# Patient Record
Sex: MALE | Race: WHITE | NOT HISPANIC OR LATINO | URBAN - METROPOLITAN AREA
[De-identification: names, ages, dates, MRNs, and addresses within clinical notes are randomized per-mention and may not be internally consistent; named-entity substitution may affect disease eponyms.]

---

## 2017-11-02 PROBLEM — R97.20 ELEVATED PSA: Status: ACTIVE | Noted: 2017-10-02

## 2017-11-02 PROBLEM — R39.15 URINARY URGENCY: Status: ACTIVE | Noted: 2017-11-02

## 2017-11-02 PROBLEM — I10 ESSENTIAL HYPERTENSION: Status: ACTIVE | Noted: 2017-11-02

## 2017-11-17 PROBLEM — Z80.42 FAMILY HISTORY OF PROSTATE CANCER: Status: ACTIVE | Noted: 2017-11-17

## 2018-04-18 ENCOUNTER — APPOINTMENT (RX ONLY)
Dept: URBAN - METROPOLITAN AREA CLINIC 349 | Facility: CLINIC | Age: 68
Setting detail: DERMATOLOGY
End: 2018-04-18

## 2018-04-18 DIAGNOSIS — L20.89 OTHER ATOPIC DERMATITIS: ICD-10-CM

## 2018-04-18 DIAGNOSIS — L57.0 ACTINIC KERATOSIS: ICD-10-CM

## 2018-04-18 DIAGNOSIS — D18.0 HEMANGIOMA: ICD-10-CM

## 2018-04-18 DIAGNOSIS — L82.0 INFLAMED SEBORRHEIC KERATOSIS: ICD-10-CM

## 2018-04-18 DIAGNOSIS — Z71.89 OTHER SPECIFIED COUNSELING: ICD-10-CM

## 2018-04-18 DIAGNOSIS — D22 MELANOCYTIC NEVI: ICD-10-CM

## 2018-04-18 DIAGNOSIS — L82.1 OTHER SEBORRHEIC KERATOSIS: ICD-10-CM

## 2018-04-18 DIAGNOSIS — L81.4 OTHER MELANIN HYPERPIGMENTATION: ICD-10-CM

## 2018-04-18 PROBLEM — L20.84 INTRINSIC (ALLERGIC) ECZEMA: Status: ACTIVE | Noted: 2018-04-18

## 2018-04-18 PROBLEM — D22.5 MELANOCYTIC NEVI OF TRUNK: Status: ACTIVE | Noted: 2018-04-18

## 2018-04-18 PROBLEM — D22.72 MELANOCYTIC NEVI OF LEFT LOWER LIMB, INCLUDING HIP: Status: ACTIVE | Noted: 2018-04-18

## 2018-04-18 PROBLEM — D18.01 HEMANGIOMA OF SKIN AND SUBCUTANEOUS TISSUE: Status: ACTIVE | Noted: 2018-04-18

## 2018-04-18 PROCEDURE — ? PHOTO-DOCUMENTATION

## 2018-04-18 PROCEDURE — ? COUNSELING

## 2018-04-18 PROCEDURE — ? LIQUID NITROGEN

## 2018-04-18 PROCEDURE — 17003 DESTRUCT PREMALG LES 2-14: CPT

## 2018-04-18 PROCEDURE — ? BENIGN DESTRUCTION

## 2018-04-18 PROCEDURE — 99243 OFF/OP CNSLTJ NEW/EST LOW 30: CPT | Mod: 25

## 2018-04-18 PROCEDURE — ? RECOMMENDATIONS

## 2018-04-18 PROCEDURE — 17110 DESTRUCTION B9 LES UP TO 14: CPT

## 2018-04-18 PROCEDURE — 17000 DESTRUCT PREMALG LESION: CPT | Mod: 59

## 2018-04-18 ASSESSMENT — LOCATION SIMPLE DESCRIPTION DERM
LOCATION SIMPLE: RIGHT SHOULDER
LOCATION SIMPLE: LEFT SCALP
LOCATION SIMPLE: POSTERIOR NECK
LOCATION SIMPLE: LEFT FOOT
LOCATION SIMPLE: LEFT THIGH
LOCATION SIMPLE: LEFT UPPER BACK
LOCATION SIMPLE: ABDOMEN
LOCATION SIMPLE: RIGHT ANTERIOR NECK
LOCATION SIMPLE: SCALP
LOCATION SIMPLE: UPPER BACK
LOCATION SIMPLE: LEFT ANTERIOR NECK
LOCATION SIMPLE: RIGHT THIGH
LOCATION SIMPLE: LEFT CHEEK
LOCATION SIMPLE: LEFT FOREHEAD
LOCATION SIMPLE: CHEST
LOCATION SIMPLE: LEFT SHOULDER
LOCATION SIMPLE: ANTERIOR SCALP

## 2018-04-18 ASSESSMENT — LOCATION DETAILED DESCRIPTION DERM
LOCATION DETAILED: LEFT SUPERIOR PARIETAL SCALP
LOCATION DETAILED: LEFT LATERAL MALAR CHEEK
LOCATION DETAILED: EPIGASTRIC SKIN
LOCATION DETAILED: RIGHT SUPERIOR PARIETAL SCALP
LOCATION DETAILED: RIGHT MEDIAL INFERIOR CHEST
LOCATION DETAILED: RIGHT CLAVICULAR NECK
LOCATION DETAILED: LEFT CENTRAL FRONTAL SCALP
LOCATION DETAILED: XIPHOID
LOCATION DETAILED: RIGHT POSTERIOR SHOULDER
LOCATION DETAILED: LEFT SUPERIOR MEDIAL FOREHEAD
LOCATION DETAILED: LEFT POSTERIOR SHOULDER
LOCATION DETAILED: SUPERIOR THORACIC SPINE
LOCATION DETAILED: LEFT DORSAL FOOT
LOCATION DETAILED: LEFT MID-UPPER BACK
LOCATION DETAILED: LEFT INFERIOR LATERAL FOREHEAD
LOCATION DETAILED: PERIUMBILICAL SKIN
LOCATION DETAILED: LEFT ANTERIOR DISTAL THIGH
LOCATION DETAILED: MID POSTERIOR NECK
LOCATION DETAILED: MID-FRONTAL SCALP
LOCATION DETAILED: LEFT MEDIAL INFERIOR CHEST
LOCATION DETAILED: LEFT ANTERIOR PROXIMAL THIGH
LOCATION DETAILED: INFERIOR THORACIC SPINE
LOCATION DETAILED: RIGHT ANTERIOR LATERAL PROXIMAL THIGH
LOCATION DETAILED: RIGHT ANTERIOR DISTAL THIGH
LOCATION DETAILED: LEFT LATERAL SUPERIOR CHEST
LOCATION DETAILED: LEFT CLAVICULAR NECK

## 2018-04-18 ASSESSMENT — LOCATION ZONE DERM
LOCATION ZONE: LEG
LOCATION ZONE: FACE
LOCATION ZONE: NECK
LOCATION ZONE: ARM
LOCATION ZONE: SCALP
LOCATION ZONE: TRUNK
LOCATION ZONE: FEET

## 2018-04-18 NOTE — PROCEDURE: RECOMMENDATIONS
Recommendations (Free Text): Per patient rash comes and goes and clears up with hydrocortisone \\nTold to continue hydrocortisone as needed for flares \\nNo rash present on exam today
Detail Level: Zone

## 2018-04-18 NOTE — PROCEDURE: BENIGN DESTRUCTION
Add 52 Modifier (Optional): no
Detail Level: Detailed
Treatment Number (Will Not Render If 0): 0
Post-Care Instructions: I reviewed with the patient in detail post-care instructions. Patient is to wear sunprotection, and avoid picking at any of the treated lesions. Pt may apply Vaseline to crusted or scabbing areas.
Medical Necessity Clause: This procedure was medically necessary because the lesions that were treated were:
Anesthesia Volume In Cc: 0.5
Medical Necessity Information: It is in your best interest to select a reason for this procedure from the list below. All of these items fulfill various CMS LCD requirements except the new and changing color options.
Consent: The patient's consent was obtained including but not limited to risks of crusting, scabbing, blistering, scarring, darker or lighter pigmentary change, recurrence, incomplete removal and infection.

## 2018-04-18 NOTE — PROCEDURE: LIQUID NITROGEN
Consent: The patient's consent was obtained including but not limited to risks of crusting, scabbing, blistering, scarring, darker or lighter pigmentary change, recurrence, incomplete removal and infection.
Render Post-Care Instructions In Note?: no
Number Of Freeze-Thaw Cycles: 2 freeze-thaw cycles
Post-Care Instructions: I reviewed with the patient in detail post-care instructions. Patient is to wear sunprotection, and avoid picking at any of the treated lesions. Pt may apply Vaseline to crusted or scabbing areas.
Detail Level: Detailed
Duration Of Freeze Thaw-Cycle (Seconds): 2

## 2018-05-02 ENCOUNTER — HOSPITAL ENCOUNTER (OUTPATIENT)
Dept: SURGERY | Age: 68
Discharge: HOME | End: 2018-05-02

## 2018-05-02 VITALS — BODY MASS INDEX: 24.5 KG/M2 | WEIGHT: 175 LBS | HEIGHT: 71 IN

## 2018-05-02 RX ORDER — CHOLECALCIFEROL TAB 125 MCG (5000 UNIT) 125 MCG
5000 TAB ORAL DAILY
COMMUNITY

## 2018-05-02 RX ORDER — IBUPROFEN 200 MG
200 TABLET ORAL AS NEEDED
COMMUNITY
End: 2019-01-04

## 2018-05-02 NOTE — PERIOP NOTES
Patient verified name, , and surgery as listed in [facility]. Type 1b surgery, phone assessment complete. Orders YES received. Labs per surgeon: none  Labs per anesthesia protocol: none      Patient answered medical/surgical history questions at their best of ability. All prior to admission medications documented in [facility]. Patient instructed to take the following medications the day of surgery according to anesthesia guidelines with a small sip of water: none . Hold all vitamins 7 days prior to surgery and NSAIDS 5 days prior to surgery. Medications to be held Ibuprofen hold for 5 days prior to surgery. Patient instructed on the following:  Arrive at A Entrance, time of arrival to be called the day before by 1700  NPO after midnight including gum, mints, and ice chips  Responsible adult must drive patient to the hospital, stay during surgery, and patient will  need supervision 24 hours after anesthesia  Use antibacterial soap in shower the night before surgery and on the morning of surgery  Leave all valuables (money and jewelry) at home but bring insurance card and ID on       DOS  Do not wear make-up, nail polish, lotions, cologne, perfumes, powders, or oil on skin. Patient teach back successful and patient demonstrates knowledge of instruction.

## 2018-05-08 ENCOUNTER — ANESTHESIA EVENT (OUTPATIENT)
Dept: SURGERY | Age: 68
End: 2018-05-08
Payer: MEDICARE

## 2018-05-09 ENCOUNTER — HOSPITAL ENCOUNTER (OUTPATIENT)
Age: 68
Setting detail: OUTPATIENT SURGERY
Discharge: HOME | End: 2018-05-09
Attending: SURGERY | Admitting: SURGERY
Payer: MEDICARE

## 2018-05-09 ENCOUNTER — ANESTHESIA (OUTPATIENT)
Dept: SURGERY | Age: 68
End: 2018-05-09
Payer: MEDICARE

## 2018-05-09 VITALS
HEIGHT: 71 IN | OXYGEN SATURATION: 96 % | RESPIRATION RATE: 20 BRPM | SYSTOLIC BLOOD PRESSURE: 150 MMHG | BODY MASS INDEX: 24.5 KG/M2 | TEMPERATURE: 97.3 F | HEART RATE: 62 BPM | WEIGHT: 175 LBS | DIASTOLIC BLOOD PRESSURE: 86 MMHG

## 2018-05-09 DIAGNOSIS — K60.2 ANAL FISSURE: Primary | ICD-10-CM

## 2018-05-09 PROCEDURE — 77030014007 HC SPNG HEMSTAT J&J -B: Performed by: SURGERY

## 2018-05-09 PROCEDURE — 77030011640 HC PAD GRND REM COVD -A: Performed by: SURGERY

## 2018-05-09 PROCEDURE — 76060000032 HC ANESTHESIA 0.5 TO 1 HR: Performed by: SURGERY

## 2018-05-09 PROCEDURE — 77030018846 HC SOL IRR STRL H20 ICUM -A: Performed by: SURGERY

## 2018-05-09 PROCEDURE — 74011000250 HC RX REV CODE- 250

## 2018-05-09 PROCEDURE — 76210000016 HC OR PH I REC 1 TO 1.5 HR: Performed by: SURGERY

## 2018-05-09 PROCEDURE — 74011000250 HC RX REV CODE- 250: Performed by: SURGERY

## 2018-05-09 PROCEDURE — 74011250636 HC RX REV CODE- 250/636: Performed by: ANESTHESIOLOGY

## 2018-05-09 PROCEDURE — 74011250636 HC RX REV CODE- 250/636

## 2018-05-09 PROCEDURE — 76010000138 HC OR TIME 0.5 TO 1 HR: Performed by: SURGERY

## 2018-05-09 PROCEDURE — 77030020143 HC AIRWY LARYN INTUB CGAS -A: Performed by: ANESTHESIOLOGY

## 2018-05-09 PROCEDURE — 77030032490 HC SLV COMPR SCD KNE COVD -B: Performed by: SURGERY

## 2018-05-09 PROCEDURE — 74011250636 HC RX REV CODE- 250/636: Performed by: SURGERY

## 2018-05-09 PROCEDURE — 77030020782 HC GWN BAIR PAWS FLX 3M -B: Performed by: ANESTHESIOLOGY

## 2018-05-09 RX ORDER — SODIUM CHLORIDE, SODIUM LACTATE, POTASSIUM CHLORIDE, CALCIUM CHLORIDE 600; 310; 30; 20 MG/100ML; MG/100ML; MG/100ML; MG/100ML
100 INJECTION, SOLUTION INTRAVENOUS CONTINUOUS
Status: DISCONTINUED | OUTPATIENT
Start: 2018-05-09 | End: 2018-05-09 | Stop reason: HOSPADM

## 2018-05-09 RX ORDER — MIDAZOLAM HYDROCHLORIDE 1 MG/ML
2 INJECTION, SOLUTION INTRAMUSCULAR; INTRAVENOUS
Status: DISCONTINUED | OUTPATIENT
Start: 2018-05-09 | End: 2018-05-09 | Stop reason: HOSPADM

## 2018-05-09 RX ORDER — SODIUM CHLORIDE 0.9 % (FLUSH) 0.9 %
5-10 SYRINGE (ML) INJECTION AS NEEDED
Status: DISCONTINUED | OUTPATIENT
Start: 2018-05-09 | End: 2018-05-09 | Stop reason: HOSPADM

## 2018-05-09 RX ORDER — FENTANYL CITRATE 50 UG/ML
INJECTION, SOLUTION INTRAMUSCULAR; INTRAVENOUS AS NEEDED
Status: DISCONTINUED | OUTPATIENT
Start: 2018-05-09 | End: 2018-05-09 | Stop reason: HOSPADM

## 2018-05-09 RX ORDER — OXYCODONE AND ACETAMINOPHEN 5; 325 MG/1; MG/1
1-2 TABLET ORAL
Qty: 30 TAB | Refills: 0 | Status: SHIPPED | OUTPATIENT
Start: 2018-05-09 | End: 2018-06-05

## 2018-05-09 RX ORDER — DIPHENHYDRAMINE HYDROCHLORIDE 50 MG/ML
12.5 INJECTION, SOLUTION INTRAMUSCULAR; INTRAVENOUS
Status: DISCONTINUED | OUTPATIENT
Start: 2018-05-09 | End: 2018-05-09 | Stop reason: HOSPADM

## 2018-05-09 RX ORDER — LIDOCAINE HYDROCHLORIDE 10 MG/ML
INJECTION INFILTRATION; PERINEURAL AS NEEDED
Status: DISCONTINUED | OUTPATIENT
Start: 2018-05-09 | End: 2018-05-09 | Stop reason: HOSPADM

## 2018-05-09 RX ORDER — PROPOFOL 10 MG/ML
INJECTION, EMULSION INTRAVENOUS AS NEEDED
Status: DISCONTINUED | OUTPATIENT
Start: 2018-05-09 | End: 2018-05-09 | Stop reason: HOSPADM

## 2018-05-09 RX ORDER — LIDOCAINE HYDROCHLORIDE 20 MG/ML
INJECTION, SOLUTION EPIDURAL; INFILTRATION; INTRACAUDAL; PERINEURAL AS NEEDED
Status: DISCONTINUED | OUTPATIENT
Start: 2018-05-09 | End: 2018-05-09 | Stop reason: HOSPADM

## 2018-05-09 RX ORDER — NALOXONE HYDROCHLORIDE 0.4 MG/ML
0.2 INJECTION, SOLUTION INTRAMUSCULAR; INTRAVENOUS; SUBCUTANEOUS AS NEEDED
Status: DISCONTINUED | OUTPATIENT
Start: 2018-05-09 | End: 2018-05-09 | Stop reason: HOSPADM

## 2018-05-09 RX ORDER — OXYCODONE HYDROCHLORIDE 5 MG/1
5 TABLET ORAL
Status: DISCONTINUED | OUTPATIENT
Start: 2018-05-09 | End: 2018-05-09 | Stop reason: HOSPADM

## 2018-05-09 RX ORDER — MIDAZOLAM HYDROCHLORIDE 1 MG/ML
2 INJECTION, SOLUTION INTRAMUSCULAR; INTRAVENOUS ONCE
Status: COMPLETED | OUTPATIENT
Start: 2018-05-09 | End: 2018-05-09

## 2018-05-09 RX ORDER — ACETAMINOPHEN 500 MG
1000 TABLET ORAL ONCE
Status: DISCONTINUED | OUTPATIENT
Start: 2018-05-09 | End: 2018-05-09 | Stop reason: HOSPADM

## 2018-05-09 RX ORDER — FENTANYL CITRATE 50 UG/ML
100 INJECTION, SOLUTION INTRAMUSCULAR; INTRAVENOUS ONCE
Status: DISCONTINUED | OUTPATIENT
Start: 2018-05-09 | End: 2018-05-09 | Stop reason: HOSPADM

## 2018-05-09 RX ORDER — OXYCODONE HYDROCHLORIDE 5 MG/1
10 TABLET ORAL
Status: DISCONTINUED | OUTPATIENT
Start: 2018-05-09 | End: 2018-05-09 | Stop reason: HOSPADM

## 2018-05-09 RX ORDER — HYDROMORPHONE HYDROCHLORIDE 2 MG/ML
0.5 INJECTION, SOLUTION INTRAMUSCULAR; INTRAVENOUS; SUBCUTANEOUS
Status: DISCONTINUED | OUTPATIENT
Start: 2018-05-09 | End: 2018-05-09 | Stop reason: HOSPADM

## 2018-05-09 RX ORDER — SODIUM CHLORIDE 0.9 % (FLUSH) 0.9 %
5-10 SYRINGE (ML) INJECTION EVERY 8 HOURS
Status: DISCONTINUED | OUTPATIENT
Start: 2018-05-09 | End: 2018-05-09 | Stop reason: HOSPADM

## 2018-05-09 RX ORDER — ONDANSETRON 2 MG/ML
INJECTION INTRAMUSCULAR; INTRAVENOUS AS NEEDED
Status: DISCONTINUED | OUTPATIENT
Start: 2018-05-09 | End: 2018-05-09 | Stop reason: HOSPADM

## 2018-05-09 RX ORDER — FLUMAZENIL 0.1 MG/ML
0.2 INJECTION INTRAVENOUS
Status: DISCONTINUED | OUTPATIENT
Start: 2018-05-09 | End: 2018-05-09 | Stop reason: HOSPADM

## 2018-05-09 RX ORDER — BUPIVACAINE HYDROCHLORIDE 5 MG/ML
INJECTION, SOLUTION EPIDURAL; INTRACAUDAL AS NEEDED
Status: DISCONTINUED | OUTPATIENT
Start: 2018-05-09 | End: 2018-05-09 | Stop reason: HOSPADM

## 2018-05-09 RX ORDER — LIDOCAINE HYDROCHLORIDE 10 MG/ML
0.1 INJECTION INFILTRATION; PERINEURAL AS NEEDED
Status: DISCONTINUED | OUTPATIENT
Start: 2018-05-09 | End: 2018-05-09 | Stop reason: HOSPADM

## 2018-05-09 RX ORDER — DEXAMETHASONE SODIUM PHOSPHATE 4 MG/ML
INJECTION, SOLUTION INTRA-ARTICULAR; INTRALESIONAL; INTRAMUSCULAR; INTRAVENOUS; SOFT TISSUE AS NEEDED
Status: DISCONTINUED | OUTPATIENT
Start: 2018-05-09 | End: 2018-05-09 | Stop reason: HOSPADM

## 2018-05-09 RX ORDER — CEFAZOLIN SODIUM/WATER 2 G/20 ML
2 SYRINGE (ML) INTRAVENOUS ONCE
Status: COMPLETED | OUTPATIENT
Start: 2018-05-09 | End: 2018-05-09

## 2018-05-09 RX ADMIN — SODIUM CHLORIDE, SODIUM LACTATE, POTASSIUM CHLORIDE, AND CALCIUM CHLORIDE 100 ML/HR: 600; 310; 30; 20 INJECTION, SOLUTION INTRAVENOUS at 13:15

## 2018-05-09 RX ADMIN — SODIUM CHLORIDE, SODIUM LACTATE, POTASSIUM CHLORIDE, AND CALCIUM CHLORIDE: 600; 310; 30; 20 INJECTION, SOLUTION INTRAVENOUS at 15:30

## 2018-05-09 RX ADMIN — PROPOFOL 200 MG: 10 INJECTION, EMULSION INTRAVENOUS at 15:20

## 2018-05-09 RX ADMIN — FENTANYL CITRATE 50 MCG: 50 INJECTION, SOLUTION INTRAMUSCULAR; INTRAVENOUS at 15:20

## 2018-05-09 RX ADMIN — FENTANYL CITRATE 25 MCG: 50 INJECTION, SOLUTION INTRAMUSCULAR; INTRAVENOUS at 15:30

## 2018-05-09 RX ADMIN — LIDOCAINE HYDROCHLORIDE 100 MG: 20 INJECTION, SOLUTION EPIDURAL; INFILTRATION; INTRACAUDAL; PERINEURAL at 15:20

## 2018-05-09 RX ADMIN — DEXAMETHASONE SODIUM PHOSPHATE 8 MG: 4 INJECTION, SOLUTION INTRA-ARTICULAR; INTRALESIONAL; INTRAMUSCULAR; INTRAVENOUS; SOFT TISSUE at 15:29

## 2018-05-09 RX ADMIN — ONDANSETRON 4 MG: 2 INJECTION INTRAMUSCULAR; INTRAVENOUS at 15:29

## 2018-05-09 RX ADMIN — MIDAZOLAM HYDROCHLORIDE 2 MG: 1 INJECTION, SOLUTION INTRAMUSCULAR; INTRAVENOUS at 15:02

## 2018-05-09 RX ADMIN — Medication 2 G: at 15:25

## 2018-05-09 NOTE — IP AVS SNAPSHOT
[address] [address] 
[phone] Patient: Darleen Hayes MRN: [id number] YRL:5[birth year]9/[birth year]93 About your hospitalization You were admitted on:  May 9, 2018 You last received care in the:  [facility] PACU You were discharged on:  May 9, 2018 Why you were hospitalized Your primary diagnosis was:  Not on File Follow-up Information Follow up With Details Comments Contact Info Kaleb Syed MD   [facility] [address] [address] 
[phone] Christelle Campos MD   [facility] [address] [facility] [place] [address] 
[phone] Your Scheduled Appointments Monday May 21, 2018  8:45 AM EDT  
LAB with PRE LAB/NURSE RESOURCE [facility] ([address] [facility]) [address]   
[phone] Monday May 21, 2018  1:30 PM EDT Global Post Op with Christelle Campos MD  
[place] [facility] ([address] [place] [address]) [address] [place] [id number] [phone] Discharge Orders None A check ray indicates which time of day the medication should be taken. My Medications START taking these medications Instructions Each Dose to Equal  
 Morning Noon Evening Bedtime  
 oxyCODONE-acetaminophen 5-325 mg per tablet Commonly known as:  PERCOCET Your last dose was: Your next dose is: Take 1-2 Tabs by mouth every four (4) hours as needed for Pain. Max Daily Amount: 12 Tabs. 1-2 Tab CHANGE how you take these medications Instructions Each Dose to Equal  
 Morning Noon Evening Bedtime  
 metoprolol succinate 50 mg XL tablet Commonly known as:  TOPROL-XL What changed:  when to take this Your last dose was: Your next dose is: Take 1.5 Tabs by mouth daily. 75 mg CONTINUE taking these medications Instructions Each Dose to Equal  
 Morning Noon Evening Bedtime DOCUSATE SODIUM PO Your last dose was: Your next dose is: Take  by mouth daily. ibuprofen 200 mg tablet Commonly known as:  MOTRIN Your last dose was: Your next dose is: Take 200 mg by mouth as needed for Pain. Indications: Pain 200 mg  
    
   
   
   
  
 pravastatin 80 mg tablet Commonly known as:  PRAVACHOL Your last dose was: Your next dose is: Take 1 Tab by mouth nightly. 80 mg  
    
   
   
   
  
 VITAMIN D3 5,000 unit Tab tablet Generic drug:  cholecalciferol (VITAMIN D3) Your last dose was: Your next dose is: Take 5,000 Units by mouth daily. Indications: PREVENTION OF VITAMIN D DEFICIENCY  
 5000 Units Where to Get Your Medications Information on where to get these meds will be given to you by the nurse or doctor. ! Ask your nurse or doctor about these medications  
  oxyCODONE-acetaminophen 5-325 mg per tablet Opioid Education Prescription Opioids: What You Need to Know: 
 
 
4. Take prescribed pain medication as directed, usually Percocet, Norco, Ultram or Dilaudid. Take over the counter medication for minor pain. 5. Ice may be applied intermittently to the surgical site or sites. 6. Call or office, [phone], if problems arise. 7. Follow up in the office at the assigned time. 8. Resume all medications as taken per surgery, unless specifically instructed not to take certain ones. 9. No lifting more than 25 pounds until told otherwise. 10. Driving is allowed 3 days after surgery as long as you feel comfortable enough to drive and have not taken any prescription pain medication prior to driving. 11. Sitz baths (Epsom salts and warm water) two to four times per day. ACO Transitions of Care Introducing [place] [address] [address] offers a voluntary care coordination program to provide high quality service and care to [facility] fee-for-service beneficiaries. Jaylon Villegas was designed to help you enhance your health and well-being through the following services: ? Transitions of Care  support for individuals who are transitioning from one care setting to another (example: Hospital to home). ? Chronic and Complex Care Coordination  support for individuals and caregivers of those with serious or chronic illnesses or with more than one chronic (ongoing) condition and those who take a number of different medications. If you meet specific medical criteria, a [facility] [address] may call you directly to coordinate your care with your primary care physician and your other care providers. For questions about the [facility] programs, please, contact your physicians office. For general questions or additional information about Accountable Care Organizations: 
Please visit www.medicare.gov/acos. html or call 1-800-MEDICARE (2-424.336.1695) TTY users should call 4-835.828.1881. Introducing [facility] & HEALTH SERVICES! Dear Molly Santiago: Thank you for requesting a Food[employer] account. Our records indicate that you already have an active Foodoro account. You can access your account anytime at [email or web address] Did you know that you can access your hospital and ER discharge instructions at any time in Foodoro? You can also review all of your test results from your hospital stay or ER visit. Additional Information If you have questions, please visit the Frequently Asked Questions section of the Food[employer] website at [email or web address] Remember, Foodoro is NOT to be used for urgent needs. For medical emergencies, dial 911. Now available from your iPhone and Android! Introducing Lucas Clayton As a [facility]  patient, I wanted to make you aware of our electronic visit tool called Lucas CollinsSquareOne. Lax.com 24/Trending Taste allows you to connect within minutes with a medical provider 24 hours a day, seven days a week via a mobile device or tablet or logging into a secure website from your computer. You can access Lucas Collinsfin from anywhere in the [place]. A virtual visit might be right for you when you have a simple condition and feel like you just dont want to get out of bed, or cant get away from work for an appointment, when your regular [facility]  provider is not available (evenings, weekends or holidays), or when youre out of town and need minor care. Electronic visits cost only $49 and if the ArvAppsfire  24/7 provider determines a prescription is needed to treat your condition, one can be electronically transmitted to a nearby pharmacy*. Please take a moment to enroll today if you have not already done so. The enrollment process is free and takes just a few minutes. To enroll, please download the Lax.com 24/7 magaly to your tablet or phone, or visit [email or web address] to enroll on your computer. And, as an [address] [place] [address] patient with a [employer] account, the results of your visits will be scanned into your electronic medical record and your primary care provider will be able to view the scanned results. We urge you to continue to see your regular [facility] provider for your ongoing medical care. And while your primary care provider may not be the one available when you seek a Lucas Rodriguezmiguel virtual visit, the peace of mind you get from getting a real diagnosis real time can be priceless. For more information on Lucas Rodriguezmoshefin, view our Frequently Asked Questions (FAQs) at [email or web address]. Sincerely, 
 
Glen Stewart MD 
Chief Medical Officer [facility] Daphnie Silva *:  certain medications cannot be prescribed via Lucas Clayton Providers Seen During Your Hospitalization Provider Specialty Primary office phone Oksana , [address] [facility] [phone] Your Primary Care Physician (PCP) Primary Care Physician Office Phone Office Fax Sonja Camden, [address] [facility] [phone] You are allergic to the following No active allergies Recent Documentation Height Weight BMI Smoking Status 1.803 m 79.4 kg 24.41 kg/m2 Never Smoker Emergency Contacts Name Discharge Info Relation Home Work Mobile Jesusita Dolan  Spouse [3] [phone] Patient Belongings The following personal items are in your possession at time of discharge: 
  Dental Appliances: None Please provide this summary of care documentation to your next provider. Signatures-by signing, you are acknowledging that this After Visit Summary has been reviewed with you and you have received a copy. Patient Signature:  ____________________________________________________________ Date:  ____________________________________________________________ `    
    
 Provider Signature:  ____________________________________________________________ Date:  ____________________________________________________________

## 2018-05-09 NOTE — DISCHARGE INSTRUCTIONS
1. Diet as tolerated except for a  low fat diet after laparoscopic cholecystectomy. 2. Showering is allowed, but no tub baths, hot tubs or swimming. 3. Drainage is common from the wounds. Change the dressings as needed. Call our office if the wounds become reddened, tender, feel warm to the touch or pus starts to drain from the wound. 4. Take prescribed pain medication as directed, usually Percocet, Norco, Ultram or Dilaudid. Take over the counter medication for minor pain. 5. Ice may be applied intermittently to the surgical site or sites. 6. Call or office, [phone], if problems arise. 7. Follow up in the office at the assigned time. 8. Resume all medications as taken per surgery, unless specifically instructed not to take certain ones. 9. No lifting more than 25 pounds until told otherwise. 10. Driving is allowed 3 days after surgery as long as you feel comfortable enough to drive and have not taken any prescription pain medication prior to driving. 11. Sitz baths (Epsom salts and warm water) two to four times per day.

## 2018-05-09 NOTE — INTERVAL H&P NOTE
H&P Update:  Ca Juarez was seen and examined. History and physical has been reviewed. The patient has been examined.  There have been no significant clinical changes since the completion of the originally dated History and Physical.    Signed By: Shelli Arias MD     May 9, 2018 10:01 AM

## 2018-05-09 NOTE — OP NOTES
[address] [facility]  OPERATIVE REPORT    Name:Day CHOU  MR#: [id number]  : 1950  ACCOUNT #: [de-identified]   DATE OF SERVICE: 2018    PREOPERATIVE DIAGNOSIS:  Anal fissure. POSTOPERATIVE DIAGNOSIS:  Anal fissure. PROCEDURE PERFORMED: Lateral sphincterotomy and anal dilatation. SURGEON:  Cas Staples MD    ANESTHESIA:  General endotracheal anesthesia with Dr. Gaby Moreno as well as local using 20 mL of 1% Xylocaine plain and 30 mL of 0.5% Marcaine plain. ESTIMATED BLOOD LOSS:  5 mL. SPECIMENS REMOVED:  None. ASSISTANT:  None. COMPLICATIONS:  None. IMPLANTS:  None. HISTORY:  This is a 63-year-old male referred by Dr. Jorje Braswell for evaluation of anorectal pain and bright red blood per rectum. He had a hard bowel movement and then had bright red blood per rectum for 2 days as well as persistent anorectal pain. His last colonoscopy was reportedly , which he described as normal.  He has had two hemorrhoidectomy procedures in the past.  The patient had a posterior anal fissure, was not healing with conservative care. He has to have this surgically cared for. We went through the risks of bleeding, infection, anesthesia, fecal incontinence, recurrence of the anal fissure. Patient was agreeable, signed a consent form and was scheduled for today. DESCRIPTION OF PROCEDURE:  The patient was seen in the preop area with his wife then transported to room #3 at [address] [place] [address].  He was placed on the operative table in supine position where general endotracheal anesthesia was administered by Dr. Gaby Moreno.  The patient's legs were placed in stirrups. The perineum and perianal regions were prepped and draped in the usual sterile manner. I did a timeout identifying the patient, surgeon, procedure and his birthdate of 1950.   Once everyone agreed with the timeout I injected a total of 50 mL of local anesthesia around the anorectal region to provide local anesthetic relief. This was 30 mL of 0.5% Marcaine plain and 20 mL of 1% Xylocaine plain. Once this had been given, I dilated the anus with one finger, two fingers, then three fingers. He had a slight stricture in his anus and upon dilating this, the stricture was broken up. We then placed an anal speculum and at the 3:00 position on the anus I did a 10%-20% sphincterotomy with an 11 blade. I put the blade in a vertical incision and moved it to a horizontal position, cutting some of the fibers as I moved the blade. I tried not to take more than 10%-20% of the fibers. Once this was done, I cauterized the area slightly as there was some bleeding, held pressure. No further bleeding was noted. I packed the anus with a roll of Gelfoam.  The patient was extubated and brought to recovery room in stable condition. A carlota-pad was placed over the perineum.       Rita Carrera MD       [address] [facility] Nani Servant  D: 05/09/2018 15:45     T: 05/09/2018 16:33  JOB #: [id number]

## 2018-05-09 NOTE — ANESTHESIA POSTPROCEDURE EVALUATION
Post-Anesthesia Evaluation and Assessment    Patient: Gloria Lopez MRN: [id number]  SSN: [id number]    YOB: 1950  Age: 76 y.o. Sex: male       Cardiovascular Function/Vital Signs  Visit Vitals    /79 (BP 1 Location: Left arm, BP Patient Position: Head of bed elevated (Comment degrees))    Pulse 66    Temp 36.3 °C (97.3 °F)    Resp 14    Ht 5' 11\" (1.803 m)    Wt 79.4 kg (175 lb)    SpO2 97%    BMI 24.41 kg/m2       Patient is status post general anesthesia for Procedure(s):  ANAL SPHINCTECTOMY AND ANAL DILITATION. Nausea/Vomiting: None    Postoperative hydration reviewed and adequate. Pain:  Pain Scale 1: Numeric (0 - 10) (05/09/18 1619)  Pain Intensity 1: 0 (05/09/18 1619)   Managed    Neurological Status:   Neuro (WDL): Exceptions to WDL (05/09/18 1554)  Neuro  Neurologic State: Drowsy (05/09/18 1604)  Orientation Level: Oriented X4 (05/09/18 1604)  Cognition: Follows commands (05/09/18 1604)   At baseline    Mental Status and Level of Consciousness: Arousable    Pulmonary Status:   O2 Device: Nasal cannula (05/09/18 1619)   Adequate oxygenation and airway patent    Complications related to anesthesia: None    Post-anesthesia assessment completed.  No concerns    Signed By: Lanette Dailey MD     May 9, 2018

## 2018-05-09 NOTE — BRIEF OP NOTE
BRIEF OPERATIVE NOTE    Date of Procedure: 5/9/2018   Preoperative Diagnosis: Posterior anal fissure [K60.2]  Postoperative Diagnosis: Same    Procedure(s): LATERAL SPHINCTECTOMY AND ANAL DILITATION  Surgeon(s) and Role:     * Tawnya Avery MD - Primary         Surgical Assistant: None    Surgical Staff:  Circ-1: Samanta Schmidt RN  Scrub Tech-1: Will Talamantes [facility]  Scrub Tech-2: [facility] Room  Event Time In   Incision Start 1528   Incision Close 1535     Anesthesia: General plus local  Estimated Blood Loss: 5 cc's  Specimens: * No specimens in log *   Findings: See dictated note  Complications: None  Implants: * No implants in log *

## 2018-05-09 NOTE — H&P (VIEW-ONLY)
aDate: 4/10/2018      Name: Black Langford      MRN: [id number]       : 1950       Age: 76 y.o. Sex: male        Kenna Jackman MD       CC:    Chief Complaint   Patient presents with    New Patient     anal fissure       HPI:     Black Langford is a 76 y.o. male who presents for evaluation of anorectal pain and BRBPR as a referral from Dr. Madyson Coreas. The patient had a \"hard bowel movement,\" then had BRBPR for two days and has had persistent anorectal pain. His last colonoscopy was in  which he describes as \"normal.\" He has two hemorrhoidectomy procedures in the past. No recent weight loss or ENZO. PMH:    Past Medical History:   Diagnosis Date    Elevated PSA     Hyperlipidemia     Hypertension        PSH:    Past Surgical History:   Procedure Laterality Date    HX COLONOSCOPY      HX HEMORRHOIDECTOMY      2971,6072    HX TONSILLECTOMY         MEDS:    Current Outpatient Prescriptions   Medication Sig    RX AMB NIFEDIPINE 0.2 % RECTAL OINTMENT COMPOUND Nifedipine 0.2% Rectal Ointment (zinc 25mg/L-Carnitine 10mg)    Apply to anus three times daily.  pravastatin (PRAVACHOL) 80 mg tablet Take 1 Tab by mouth nightly.  metoprolol succinate (TOPROL-XL) 50 mg XL tablet Take 1.5 Tabs by mouth daily. No current facility-administered medications for this visit. ALLERGIES:      No Known Allergies    SH:    Social History   Substance Use Topics    Smoking status: Never Smoker    Smokeless tobacco: Never Used    Alcohol use Yes      Comment: Socially       FH:    Family History   Problem Relation Age of Onset    Prostate Cancer Father     Prostate Cancer Brother     Hypertension Mother        ROS: The patient has no difficulty with chest pain or shortness of breath. No fever or chills. Comprehensive 13 point review of systems was otherwise unremarkable except as noted above.     Physical Exam:     Visit Vitals    /86    Pulse 66    Ht 5' 11\" (1.803 m)    Wt 178 lb (80.7 kg)    BMI 24.83 kg/m2       General: Alert, oriented, cooperative white male in no acute distress. Eyes: Sclera are clear. Conjunctiva and lids within normal limits. No icterus. Ears and Nose: no gross deformities to visual inspection, gross hearing intact  Neck: Supple, trachea midline. No lymphadenopathy. Rectal: Posterior anal fissure. Sphincter in spasm. No bleeding noted. Resp: Breathing is  non-labored. Lungs clear to auscultation without wheezing or rhonchi   CV: RRR. No murmurs, rubs or gallops appreciated. Abd: soft non-tender and non-distended without peritoneal signs. +bs    Psych:  Mood and affect appropriate. Short-term memory and understanding intact      Assessment/Plan:  Nick Gross is a 76 y.o. male who has signs and symptoms consistent with an anal fissure. 1.  Posterior lateral sphincterotomy with anal dilatation in the operating room. 2.  I went through the risks of bleeding, infection, anesthesia, hematoma/seroma formation and possible fecal incontinence.     Janessa Mares MD      [facility]   4/10/2018  12:36 PM

## 2018-05-09 NOTE — ANESTHESIA PREPROCEDURE EVALUATION
Anesthetic History   No history of anesthetic complications            Review of Systems / Medical History  Patient summary reviewed and pertinent labs reviewed    Pulmonary  Within defined limits                 Neuro/Psych   Within defined limits           Cardiovascular    Hypertension: well controlled          Hyperlipidemia    Exercise tolerance: >4 METS     GI/Hepatic/Renal     GERD: well controlled           Endo/Other  Within defined limits           Other Findings              Physical Exam    Airway  Mallampati: II  TM Distance: 4 - 6 cm  Neck ROM: normal range of motion   Mouth opening: Normal     Cardiovascular    Rhythm: regular  Rate: normal         Dental  No notable dental hx       Pulmonary  Breath sounds clear to auscultation               Abdominal         Other Findings            Anesthetic Plan    ASA: 2  Anesthesia type: general            Anesthetic plan and risks discussed with: Patient and Spouse

## 2018-09-24 ENCOUNTER — HOSPITAL ENCOUNTER (OUTPATIENT)
Dept: LAB | Age: 68
Discharge: HOME | End: 2018-09-24

## 2018-09-24 PROCEDURE — 88305 TISSUE EXAM BY PATHOLOGIST: CPT

## 2018-10-12 ENCOUNTER — APPOINTMENT (RX ONLY)
Dept: URBAN - METROPOLITAN AREA CLINIC 349 | Facility: CLINIC | Age: 68
Setting detail: DERMATOLOGY
End: 2018-10-12

## 2018-10-12 DIAGNOSIS — L57.0 ACTINIC KERATOSIS: ICD-10-CM

## 2018-10-12 DIAGNOSIS — L57.8 OTHER SKIN CHANGES DUE TO CHRONIC EXPOSURE TO NONIONIZING RADIATION: ICD-10-CM

## 2018-10-12 DIAGNOSIS — L82.0 INFLAMED SEBORRHEIC KERATOSIS: ICD-10-CM

## 2018-10-12 DIAGNOSIS — L81.4 OTHER MELANIN HYPERPIGMENTATION: ICD-10-CM

## 2018-10-12 PROBLEM — D04.39 CARCINOMA IN SITU OF SKIN OF OTHER PARTS OF FACE: Status: ACTIVE | Noted: 2018-10-12

## 2018-10-12 PROBLEM — E78.5 HYPERLIPIDEMIA, UNSPECIFIED: Status: ACTIVE | Noted: 2018-10-12

## 2018-10-12 PROCEDURE — ? PATHOLOGY BILLING

## 2018-10-12 PROCEDURE — ? BIOPSY BY SHAVE METHOD

## 2018-10-12 PROCEDURE — 17110 DESTRUCTION B9 LES UP TO 14: CPT

## 2018-10-12 PROCEDURE — ? BENIGN DESTRUCTION

## 2018-10-12 PROCEDURE — 88305 TISSUE EXAM BY PATHOLOGIST: CPT

## 2018-10-12 PROCEDURE — A4550 SURGICAL TRAYS: HCPCS

## 2018-10-12 PROCEDURE — 11100: CPT | Mod: 59

## 2018-10-12 PROCEDURE — 99213 OFFICE O/P EST LOW 20 MIN: CPT | Mod: 25

## 2018-10-12 PROCEDURE — ? COUNSELING

## 2018-10-12 PROCEDURE — 11101: CPT

## 2018-10-12 ASSESSMENT — LOCATION DETAILED DESCRIPTION DERM
LOCATION DETAILED: LEFT PROXIMAL DORSAL FOREARM
LOCATION DETAILED: MID-FRONTAL SCALP
LOCATION DETAILED: RIGHT ANTERIOR DISTAL THIGH
LOCATION DETAILED: RIGHT PROXIMAL DORSAL FOREARM
LOCATION DETAILED: RIGHT MEDIAL FRONTAL SCALP
LOCATION DETAILED: RIGHT ANTERIOR PROXIMAL THIGH
LOCATION DETAILED: MID-FRONTAL SCALP

## 2018-10-12 ASSESSMENT — LOCATION ZONE DERM
LOCATION ZONE: SCALP
LOCATION ZONE: ARM
LOCATION ZONE: SCALP
LOCATION ZONE: LEG

## 2018-10-12 ASSESSMENT — LOCATION SIMPLE DESCRIPTION DERM
LOCATION SIMPLE: ANTERIOR SCALP
LOCATION SIMPLE: RIGHT SCALP
LOCATION SIMPLE: ANTERIOR SCALP
LOCATION SIMPLE: LEFT FOREARM
LOCATION SIMPLE: RIGHT FOREARM
LOCATION SIMPLE: RIGHT THIGH

## 2018-10-12 NOTE — PROCEDURE: PATHOLOGY BILLING
Immunohistochemistry ([id number] and [id number]) billing is not performed here. Please use the Immunohistochemistry Stain Billing plan to accomplish this.

## 2018-10-12 NOTE — PROCEDURE: BIOPSY BY SHAVE METHOD
X Size Of Lesion In Cm: 0.8
Size Of Lesion In Cm: 0
Billing Type: Third-Party Bill
Detail Level: Detailed
Render Post-Care Instructions In Note?: yes
Wound Care: Vaseline
Biopsy Type: H and E
Destruction After The Procedure: No
Accession #: md spence
Consent: Written consent was obtained and risks were reviewed including but not limited to scarring, infection, bleeding, scabbing, incomplete removal, nerve damage and allergy to anesthesia.
Biopsy Method: Janes graves
Anesthesia Volume In Cc (Will Not Render If 0): 0.5
Electrodesiccation Text: The wound bed was treated with electrodesiccation after the biopsy was performed.
Post-Care Instructions: I reviewed with the patient in detail post-care instructions. Patient is to keep the biopsy site dry overnight, and then apply bacitracin twice daily until healed. Patient may apply hydrogen peroxide soaks to remove any crusting.\\nAft the procedure, the patient was observed for 5-10 minutes and was oriented to person, place, and time.  Denied feeling dizzy, queasy, and stated that they did not feel that they were going to faint.
Depth Of Biopsy: dermis
Anesthesia Type: 1% lidocaine with 1:500,000 epinephrine and a 1:10 solution of 8.4% sodium bicarbonate
Notification Instructions: Patient will be notified of biopsy results. However, patient instructed to call the office if not contacted within 2 weeks.
Type Of Destruction Used: Electrodesiccation
Hemostasis: Aluminum Chloride
X Size Of Lesion In Cm: 0.7
Dressing: bandage
Cryotherapy Text: The wound bed was treated with cryotherapy after the biopsy was performed.

## 2018-10-12 NOTE — PROCEDURE: BENIGN DESTRUCTION
Medical Necessity Information: It is in your best interest to select a reason for this procedure from the list below. All of these items fulfill various CMS LCD requirements except the new and changing color options.
Detail Level: Detailed
Consent: The patient's consent was obtained including but not limited to risks of crusting, scabbing, blistering, scarring, darker or lighter pigmentary change, recurrence, incomplete removal and infection.
Render Post-Care Instructions In Note?: no
Medical Necessity Clause: This procedure was medically necessary because the lesions that were treated were:
Anesthesia Volume In Cc: 0.5
Post-Care Instructions: I reviewed with the patient in detail post-care instructions. Patient is to wear sunprotection, and avoid picking at any of the treated lesions. Pt may apply Vaseline to crusted or scabbing areas.
Treatment Number (Will Not Render If 0): 0

## 2018-10-12 NOTE — PROCEDURE: PATHOLOGY BILLING
Immunohistochemistry ([id number] and [id number]) billing is not performed here. Please use the Immunohistochemistry Stain Billing plan to accomplish this. Immunohistochemistry ([id number] and [id number]) billing is not performed here. Please use the Immunohistochemistry Stain Billing plan to accomplish this.

## 2018-10-31 ENCOUNTER — APPOINTMENT (OUTPATIENT)
Dept: RADIATION ONCOLOGY | Age: 68
End: 2018-10-31

## 2018-11-01 ENCOUNTER — HOSPITAL ENCOUNTER (OUTPATIENT)
Dept: RADIATION ONCOLOGY | Age: 68
Discharge: HOME | End: 2018-11-01
Payer: MEDICARE

## 2018-11-01 ENCOUNTER — APPOINTMENT (RX ONLY)
Dept: URBAN - METROPOLITAN AREA CLINIC 349 | Facility: CLINIC | Age: 68
Setting detail: DERMATOLOGY
End: 2018-11-01

## 2018-11-01 VITALS
BODY MASS INDEX: 25.63 KG/M2 | DIASTOLIC BLOOD PRESSURE: 86 MMHG | WEIGHT: 183.8 LBS | OXYGEN SATURATION: 99 % | TEMPERATURE: 98.1 F | SYSTOLIC BLOOD PRESSURE: 146 MMHG | HEART RATE: 79 BPM

## 2018-11-01 DIAGNOSIS — L82.0 INFLAMED SEBORRHEIC KERATOSIS: ICD-10-CM

## 2018-11-01 DIAGNOSIS — C61 PROSTATE CANCER (HCC): Primary | ICD-10-CM

## 2018-11-01 PROBLEM — D04.72 CARCINOMA IN SITU OF SKIN OF LEFT LOWER LIMB, INCLUDING HIP: Status: ACTIVE | Noted: 2018-11-01

## 2018-11-01 PROBLEM — D04.39 CARCINOMA IN SITU OF SKIN OF OTHER PARTS OF FACE: Status: ACTIVE | Noted: 2018-11-01

## 2018-11-01 PROCEDURE — 11301 SHAVE SKIN LESION 0.6-1.0 CM: CPT

## 2018-11-01 PROCEDURE — 88305 TISSUE EXAM BY PATHOLOGIST: CPT

## 2018-11-01 PROCEDURE — ? PATHOLOGY BILLING

## 2018-11-01 PROCEDURE — 11100: CPT | Mod: 59

## 2018-11-01 PROCEDURE — 99211 OFF/OP EST MAY X REQ PHY/QHP: CPT

## 2018-11-01 PROCEDURE — 17281 DSTR MAL LS F/E/E/N/L/M .6-1: CPT

## 2018-11-01 PROCEDURE — ? BIOPSY BY SHAVE METHOD

## 2018-11-01 PROCEDURE — ? CURETTAGE AND DESTRUCTION

## 2018-11-01 PROCEDURE — A4550 SURGICAL TRAYS: HCPCS

## 2018-11-01 ASSESSMENT — LOCATION DETAILED DESCRIPTION DERM
LOCATION DETAILED: RIGHT ANTERIOR DISTAL THIGH
LOCATION DETAILED: LEFT ANTERIOR PROXIMAL THIGH
LOCATION DETAILED: RIGHT ANTERIOR DISTAL THIGH

## 2018-11-01 ASSESSMENT — LOCATION SIMPLE DESCRIPTION DERM
LOCATION SIMPLE: RIGHT THIGH
LOCATION SIMPLE: RIGHT THIGH
LOCATION SIMPLE: LEFT THIGH

## 2018-11-01 ASSESSMENT — LOCATION ZONE DERM
LOCATION ZONE: LEG
LOCATION ZONE: LEG

## 2018-11-01 NOTE — PROCEDURE: PATHOLOGY BILLING
Immunohistochemistry ([id number] and [id number]) billing is not performed here. Please use the Immunohistochemistry Stain Billing plan to accomplish this. Immunohistochemistry ([id number] and [id number]) billing is not performed here. Please use the Immunohistochemistry Stain Billing plan to accomplish this.

## 2018-11-01 NOTE — PROCEDURE: CURETTAGE AND DESTRUCTION
Consent was obtained from the patient. The risks, benefits and alternatives to therapy were discussed in detail. Specifically, the risks of infection, scarring, bleeding, prolonged wound healing, nerve injury, incomplete removal, allergy to anesthesia and recurrence were addressed. Alternatives to ED&C, such as: surgical removal and XRT were also discussed.  Prior to the procedure, the treatment site was clearly identified and confirmed by the patient. All components of Universal Protocol/PAUSE Rule completed.
Render Post-Care Instructions In Note?: no
Additional Information: (Optional): The wound was cleaned, and a pressure dressing was applied.  The patient received detailed post-op details in length. Aft the procedure, the patient was observed for 5-10 minutes and was oriented to person, place, and time.  Denied feeling dizzy, queasy, and stated that they did not feel that they were going to faint.
Size Of Lesion After Curettage: 0.8
Detail Level: Detailed
Anesthesia Volume In Cc: 0.5
Post-Care Instructions: I reviewed with the patient in detail post-care instructions. Patient is to keep the area dry for 48 hours, and not to engage in any swimming until the area is healed. Should the patient develop any fevers, chills, bleeding, severe pain patient will contact the office immediately.
Number Of Curettages: 2
Size Of Lesion In Cm: 0.6
Bill For Surgical Tray: yes
Total Volume (Ccs): 1
Cautery Type: electrodesiccation
Bill As A Line Item Or As Units: Line Item
Anesthesia Type: 1% lidocaine with 1:100,000 epinephrine and a 1:10 solution of 8.4% sodium bicarbonate
What Was Performed First?: Destruction

## 2018-11-01 NOTE — PROCEDURE: PATHOLOGY BILLING
Immunohistochemistry ([id number] and [id number]) billing is not performed here. Please use the Immunohistochemistry Stain Billing plan to accomplish this.

## 2018-11-01 NOTE — NURSE NAVIGATOR
Consult for Prostate cancer. Aua/Epic completed. Biopsy 9-24-18. Last Psa 8-15-18. F/u Dr. Ely Guadalupe 11-9-18. No recent imaging.     Van Watson RN

## 2018-11-01 NOTE — PROGRESS NOTES
Patient: Kristin Baeza MRN: [id number]  SSN: [id number]    YOB: 1950  Age: 76 y.o. Sex: male      Other Providers:  Redd iWng MD    CHIEF COMPLAINT: Prostate Cancer    DIAGNOSIS: Low risk prostate adenocarcinoma, GS 3+3=6, 2/12 cores, PSA 5.6 (Density 0.13)    PREVIOUS TREATMENT:  1)  TRUS Biopsy 9/24/18    HISTORY OF PRESENT ILLNESS:  Kristin Baeza is a 76 y.o. male who I am seeing at the request of Dr. Luna Rush for prostate cancer. He was seen and evaluated for an elevated PSA on routine screening. His pertinent past medical history includes only GERD, HLD, and HTN. He did have prior sphincterotomy for an anal fissure in 5/18. He has had multiple (2) prior hemhoridectomies. His brother and father had prostate cancer. His father was treated with prostatectomy which he described as \"hard\" on him. His brother had brachytherapy. PSA History:    8/18:  5.6  8/17:  4.3  11/17:  3.1  5/15:  2.86  2014:  2.2    Biopsy Results:  42 cc 9/24/18  2/12 cores, GS 3+3=6 2/12 cores, 10% right mid, 80% right lateral base    He was ultimately seen back in the urology office to discuss the results of the biopsy and management options. Both radiation and surgical options were discussed and he has been referred to me to further discuss radiation as an option for management of his prostate cancer. Dr. Luna Rush discussed active surveillance but the patient was adamant that he wanted definitive therapy. He mentioned specifically CyberKnife as a potential option. GENITOURINARY REVIEW OF SYSTEMS:  An EPIC 26 was completed pretreatment. His AUA score was 10. He had few issues, mainly weak stream and frequency less than half the time.        PAST MEDICAL HISTORY:    Past Medical History:   Diagnosis Date    Cancer ([address]     Elevated PSA     Hyperlipidemia     Hypertension     Pyloric stenosis 1950       The patient denies history of collagen vascular diseases, pacemaker insertion, prior radiation or prior chemotherapy. PAST SURGICAL HISTORY:   Past Surgical History:   Procedure Laterality Date    HX COLONOSCOPY      HX HEMORRHOIDECTOMY      3384,1679    HX HEMORRHOIDECTOMY  1975, 5    HX OTHER SURGICAL      Dilation and lateral sphincterotomy 5/18    HX OTHER SURGICAL  1985    for Pyloric Stenosis    HX TONSILLECTOMY      HX VASECTOMY  1985       MEDICATIONS:     Current Outpatient Medications:     metoprolol succinate (TOPROL XL) 50 mg XL tablet, Take 50 mg by mouth daily. Take 1 1/2 tabs daily, Disp: , Rfl:     pravastatin (PRAVACHOL) 80 mg tablet, Take 1 Tab by mouth nightly., Disp: 90 Tab, Rfl: 1    cyclobenzaprine (FLEXERIL) 10 mg tablet, TK ONE T PO Q 8 H PRF MUSCLE SPASMS-taking one daily, Disp: , Rfl: 2    DOCUSATE SODIUM PO, Take  by mouth daily. , Disp: , Rfl:     ibuprofen (MOTRIN) 200 mg tablet, Take 200 mg by mouth as needed for Pain. Indications: Pain, Disp: , Rfl:     cholecalciferol, VITAMIN D3, (VITAMIN D3) 5,000 unit tab tablet, Take 5,000 Units by mouth daily.  Indications: PREVENTION OF VITAMIN D DEFICIENCY, Disp: , Rfl:     ALLERGIES:   No Known Allergies    SOCIAL HISTORY:   Social History     Socioeconomic History    Marital status:      Spouse name: Not on file    Number of children: Not on file    Years of education: Not on file    Highest education level: Not on file   Social Needs    Financial resource strain: Not on file    Food insecurity - worry: Not on file    Food insecurity - inability: Not on file   [employer] needs - medical: Not on file   [employer] needs - non-medical: Not on file   Occupational History    Not on file   Tobacco Use    Smoking status: Never Smoker    Smokeless tobacco: Never Used   Substance and Sexual Activity    Alcohol use: Yes     Comment: Socially    Drug use: No    Sexual activity: Yes     Partners: Female   Other Topics Concern     Service Not Asked    Blood Transfusions Not Asked  Caffeine Concern Not Asked    Occupational Exposure Not Asked    Hobby Hazards Not Asked    Sleep Concern Not Asked    Stress Concern Not Asked    Weight Concern Not Asked    Special Diet Not Asked    Back Care Not Asked    Exercise Not Asked    Bike Helmet Not Asked   [address] Not Asked    Self-Exams Not Asked   Social History Narrative    Not on file       FAMILY HISTORY:   Family History   Problem Relation Age of Onset    Prostate Cancer Father     Prostate Cancer Brother     Hypertension Mother        REVIEW OF SYSTEMS: Please see the completed review of systems sheet in the chart that I have reviewed today. PHYSICAL EXAMINATION:   ECOG Performance status 0  VITAL SIGNS:   Visit Vitals  /86 (BP 1 Location: Left arm, BP Patient Position: Sitting)   Pulse 79   Temp 98.1 °F (36.7 °C)   Wt 83.4 kg (183 lb 12.8 oz)   SpO2 99%   BMI 25.63 kg/m²        GENERAL: The patient is well-developed, ambulatory, alert and in no acute distress. HEENT: Head is normocephalic, atraumatic. Pupils are equal, round and reactive to light and accommodation. Extraocular movement intact. Hearing is intact bilaterally to finger rub. Oral cavity reveals no lesions. Mucous membranes are moist. NECK: Neck is supple with no masses. CARDIOVASCULAR: Heart is regular rate and rhythm. There are no murmurs rubs or gallups. Radial pulses are 2+ RESPIRATORY: Lungs are clear to auscultation and percussion. There is normal respiratory effort. GASTROINTESTINAL: The abdomen is soft, non-tender, nondistended with no hepatospelnomagaly. Digital rectal examination: No evidence of internal or external hemorrhoids, clear rectal vault without palpable masses, smooth prostate gland with no nodularity. LYMPHATIC: There is no cervical, supraclavicular or axillary lymphadenopathy bilaterally. MUSCULOSKELETAL: Extremities reveal no cyanosis, clubbing or edema.  is 5+/5. NEURO:  Cranial nerves II-XII grossly intact.   Muscular strength and sensation are intact throughout all four extremities. PATHOLOGY:    Please see HPI for details of TRUS Biopsy. 9/24/18:  PROSTATE VOLUME:  42 gm    PSAD 0.13  Path:    DIAGNOSIS   A: \"PROSTATE, LEFT LATERAL BASE, BIOPSY\":   NO CARCINOMA IDENTIFIED. B: \"PROSTATE, LEFT LATERAL MID, BIOPSY\":   NO CARCINOMA IDENTIFIED. C: \"PROSTATE, LEFT LATERAL APEX, BIOPSY\":   NO CARCINOMA IDENTIFIED. D: \"PROSTATE, LEFT BASE, BIOPSY\":   NO CARCINOMA IDENTIFIED. E: \"PROSTATE, LEFT MID, BIOPSY\":   NO CARCINOMA IDENTIFIED. F: \"PROSTATE, LEFT APEX, BIOPSY\":   NO CARCINOMA IDENTIFIED. G: \"PROSTATE, RIGHT BASE, BIOPSY\":   NO CARCINOMA IDENTIFIED. H: \"PROSTATE, RIGHT MID, BIOPSY\":   PROSTATIC ADENOCARCINOMA, DARIEL SCORE 3 + 3 = 6, DISCONTINUOUSLY   INVOLVING 10% OF 1 CORE. I: \"PROSTATE, RIGHT APEX, BIOPSY\":   NO CARCINOMA IDENTIFIED. J: \"PROSTATE, RIGHT LATERAL BASE, BIOPSY\":   PROSTATIC ADENOCARCINOMA, DARIEL SCORE 3 + 3 = 6, INVOLVING 80%   OF 1 OF 2 CORES. K: \"PROSTATE, RIGHT LATERAL MID, BIOPSY\":   HIGH GRADE PROSTATIC INTRAEPITHELIAL NEOPLASIA. L: \"PROSTATE, RIGHT LATERAL APEX, BIOPSY\":   NO CARCINOMA IDENTIFIED. LABORATORY:   Lab Results   Component Value Date/Time    Sodium 142 08/15/2018 09:23 AM    Potassium 4.9 08/15/2018 09:23 AM    Chloride 102 08/15/2018 09:23 AM    CO2 25 08/15/2018 09:23 AM    Glucose 83 08/15/2018 09:23 AM    BUN 15 08/15/2018 09:23 AM    Creatinine 1.11 08/15/2018 09:23 AM    GFR est AA 78 08/15/2018 09:23 AM    GFR est non-AA 68 08/15/2018 09:23 AM    Calcium 9.6 08/15/2018 09:23 AM    Albumin 4.6 05/21/2018 09:53 AM    Protein, total 7.3 05/21/2018 09:53 AM    A-G Ratio 1.6 02/20/2018 08:37 AM    AST (SGOT) 25 05/21/2018 09:53 AM    ALT (SGPT) 25 05/21/2018 09:53 AM     No results found for: WBC, HGB, HCT, PLT, HGBEXT, HCTEXT, PLTEXT, HGBEXT, HCTEXT, PLTEXT    Please see HPI for PSA History. RADIOLOGY:    No results found.     IMPRESSION:  Efrain Knapp Andrew Muñiz is a 76 y.o. male with low risk prostate cancer. The natural history of prostate cancer was reviewed with the patient. Prognostic features including stage, Suffield score, age, race, and PSA were reviewed. Treatment options for prostate cancer including active surveillance, hormonal therapy, radiotherapy, and surgery were compared and contrasted with regard to outcome and quality of life. I discussed the radiotherapy options including low-dose rate brachytherapy, high-dose-rate brachytherapy, and external beam radiation therapy. We also discussed the addition of hormone therapy. Side effects and complications of radiation therapy including fatigue, urinary obstructive symptoms, rectal irritation and bleeding, and decline of sexual function were among the complications highlighted. He asked many questions which were answered to his satisfaction. For low risk disease, there are several viable treatment options including active surveillance which includes delaying definitive therapy until which time the disease either progresses, or the patient is no longer interested in this option. At this time surgery with radical prostatectomy, LDR brachytherapy, or IMRT as monotherapy are recommended. I advised the patient each of these options offer similar tumor control with differences found mainly in varying toxicity profiles. I do not recommend hormone therapy low risk disease. Generally LDR brachytherapy is reserved for low risk prostate cancer patients with acceptable anatomy. This includes prostate glands between 20 and 60 cc. Outside of these prerequisites, the dosimetry is difficult. Otherwise I recommend an iodine I-125 implant delivering 145 Gy. I discussed the preplan that is required prior to ordering seeds which is done under ultrasound guidance.  The patient then returns for a single treatment completed under anesthesia with the assistance of urology both during and following the placement of the seeds when a cystoscopy is performed. We outlined the placement of a catheter which remains with the patient for the next 24 hours before it is removed if adequate urinary outflow is achieved. IMRT is given as a single modality treatment or as initial treatment prior to an HDR boost.  A total of 45 Gy would be delivered to the pelvis and prostate gland followed by a 15 gray in 1 fraction HDR boost to the prostate gland alone. For patients who are not candidates for brachytherapy either by poor anatomy meaning substantial arch interference or a prostate gland outside of 20-60 cc where good dosimetry is often not achievable, IMRT is given for the entirety of the treatment course. Furthermore, for low or intermediate risk, there is now substantial data in support of hypo-fractionated radiation. Commonly a dose greater than 2 Gy per fraction, such as 2.5 Gy as given in numerous clinical trials including RTOG 0415, to a total dose of 70 Gy offers similar toxicity outcomes and is found to be non-inferior to standard fractionated radiation and therefore more logistically appealing. SBRT is given as single modality treatment for low risk prostate cancer. There is new and evolving data from the radiation oncology literature and support of what is complained as extreme hypo-fractionation. This generally includes treatment over 3-5 fractions to the prostate gland alone. Current studies have really evaluated only low risk disease with targeting of the prostate gland alone excluding the seminal vesicles and the lymph nodes. This is also generally offered for patients with low AUA scores. While its indications overlap substantially with LDR brachytherapy, it does not have the limitation of prostate gland size between 20 and 60 cc as even smaller glands are easily targetable with this technique.  It also offers a substantial advantage of being given as an outpatient without any surgery required for any protracted hospital stay. While we are confident long-term data will be in support of this is a single modality treatment, its major limitation at this point is short follow-up on the studies that have been completed. Nonetheless, it is an excellent option for patients with low risk prostate cancer who are either not candidates for surgery or who wish to avoid surgery. Based on the current collected information, he has low risk disease with 2 cores of Pieter 6. His PSA is below 10. I would like to further clarify his disease with MRI along with presentation to report before making final decisions. At this point, we discussed. Radiation based options comparing contrasting active surveillance, surgery, and radiation specifically. Regarding radiation, with low risk disease I feel he would be a candidate for moderately hypofractionated radiation or ultra hypo-fractionated radiation with CyberKnife which may be his best and most simplest option and how he was leaning at the end of our discussion if he remains a candidate. PLAN:    1) Discussed viable treatment options focusing on external beam radiation highlighting the risks, benefits, and side effects from treatment. 2) Reviewed available research treatment and cancer care protocols for which patient may be eligible. Unfortunately there are no matching clinical trials available at this time. 3) Further staging to include MRI. 4) Patient will be scheduled for follow up after presentation in tumor board and subsequent staging scans before making a treatment decision.        Antony Thomas MD   November 1, 2018

## 2018-11-01 NOTE — PROCEDURE: BIOPSY BY SHAVE METHOD
Dressing: bandage
Biopsy Type: H and E
Type Of Destruction Used: Electrodesiccation
Anesthesia Type: 1% lidocaine with 1:500,000 epinephrine and a 1:10 solution of 8.4% sodium bicarbonate
Wound Care: Vaseline
Consent: Written consent was obtained and risks were reviewed including but not limited to scarring, infection, bleeding, scabbing, incomplete removal, nerve damage and allergy to anesthesia.
Accession #: md spence
Bill For Surgical Tray: yes
Anesthesia Volume In Cc (Will Not Render If 0): 0.5
Detail Level: Detailed
Additional Anesthesia Volume In Cc (Will Not Render If 0): 0
Post-Care Instructions: I reviewed with the patient in detail post-care instructions. Patient is to keep the biopsy site dry overnight, and then apply bacitracin twice daily until healed. Patient may apply hydrogen peroxide soaks to remove any crusting.\\nAft the procedure, the patient was observed for 5-10 minutes and was oriented to person, place, and time.  Denied feeling dizzy, queasy, and stated that they did not feel that they were going to faint.
Cryotherapy Text: The wound bed was treated with cryotherapy after the biopsy was performed.
Hemostasis: Aluminum Chloride
Billing Type: Third-Party Bill
Electrodesiccation Text: The wound bed was treated with electrodesiccation after the biopsy was performed.
Notification Instructions: Patient will be notified of biopsy results. However, patient instructed to call the office if not contacted within 2 weeks.
Size Of Lesion In Cm: 0.6
Destruction After The Procedure: No
Depth Of Biopsy: dermis
Biopsy Method: Janes graves

## 2018-11-08 ENCOUNTER — HOSPITAL ENCOUNTER (OUTPATIENT)
Dept: MRI IMAGING | Age: 68
Discharge: HOME | End: 2018-11-08
Attending: RADIOLOGY
Payer: MEDICARE

## 2018-11-08 DIAGNOSIS — C61 PROSTATE CANCER (HCC): ICD-10-CM

## 2018-11-08 PROCEDURE — 74011000258 HC RX REV CODE- 258: Performed by: RADIOLOGY

## 2018-11-08 PROCEDURE — 74011250636 HC RX REV CODE- 250/636: Performed by: RADIOLOGY

## 2018-11-08 PROCEDURE — 72197 MRI PELVIS W/O & W/DYE: CPT

## 2018-11-08 PROCEDURE — A9575 INJ GADOTERATE MEGLUMI 0.1ML: HCPCS | Performed by: RADIOLOGY

## 2018-11-08 RX ORDER — GADOTERATE MEGLUMINE 376.9 MG/ML
16 INJECTION INTRAVENOUS
Status: COMPLETED | OUTPATIENT
Start: 2018-11-08 | End: 2018-11-08

## 2018-11-08 RX ORDER — SODIUM CHLORIDE 0.9 % (FLUSH) 0.9 %
10 SYRINGE (ML) INJECTION
Status: COMPLETED | OUTPATIENT
Start: 2018-11-08 | End: 2018-11-08

## 2018-11-08 RX ADMIN — SODIUM CHLORIDE 100 ML: 900 INJECTION, SOLUTION INTRAVENOUS at 10:53

## 2018-11-08 RX ADMIN — GADOTERATE MEGLUMINE 16 ML: 376.9 INJECTION INTRAVENOUS at 10:53

## 2018-11-08 RX ADMIN — Medication 10 ML: at 10:53

## 2018-11-28 ENCOUNTER — APPOINTMENT (OUTPATIENT)
Dept: RADIATION ONCOLOGY | Age: 68
End: 2018-11-28

## 2018-11-29 ENCOUNTER — APPOINTMENT (RX ONLY)
Dept: URBAN - METROPOLITAN AREA CLINIC 349 | Facility: CLINIC | Age: 68
Setting detail: DERMATOLOGY
End: 2018-11-29

## 2018-11-29 PROBLEM — D04.72 CARCINOMA IN SITU OF SKIN OF LEFT LOWER LIMB, INCLUDING HIP: Status: ACTIVE | Noted: 2018-11-29

## 2018-11-29 PROCEDURE — 17261 DSTRJ MAL LES T/A/L .6-1.0CM: CPT

## 2018-11-29 PROCEDURE — ? CURETTAGE AND DESTRUCTION

## 2018-11-29 PROCEDURE — A4550 SURGICAL TRAYS: HCPCS

## 2018-11-29 NOTE — PROCEDURE: CURETTAGE AND DESTRUCTION
Number Of Curettages: 2
Detail Level: Detailed
Anesthesia Type: 1% lidocaine with 1:100,000 epinephrine and a 1:10 solution of 8.4% sodium bicarbonate
Add Intralesional Injection: No
Post-Care Instructions: I reviewed with the patient in detail post-care instructions. Patient is to keep the area dry for 48 hours, and not to engage in any swimming until the area is healed. Should the patient develop any fevers, chills, bleeding, severe pain patient will contact the office immediately.
Bill For Surgical Tray: yes
Cautery Type: electrodesiccation
Bill As A Line Item Or As Units: Line Item
Size Of Lesion In Cm: 0.6
Additional Information: (Optional): The wound was cleaned, and a pressure dressing was applied.  The patient received detailed post-op details in length. Aft the procedure, the patient was observed for 5-10 minutes and was oriented to person, place, and time.  Denied feeling dizzy, queasy, and stated that they did not feel that they were going to faint.
What Was Performed First?: Destruction
Size Of Lesion After Curettage: 0.8
Consent was obtained from the patient. The risks, benefits and alternatives to therapy were discussed in detail. Specifically, the risks of infection, scarring, bleeding, prolonged wound healing, nerve injury, incomplete removal, allergy to anesthesia and recurrence were addressed. Alternatives to ED&C, such as: surgical removal and XRT were also discussed.  Prior to the procedure, the treatment site was clearly identified and confirmed by the patient. All components of Universal Protocol/PAUSE Rule completed.
Total Volume (Ccs): 1
Anesthesia Volume In Cc: 0.5

## 2018-12-13 ENCOUNTER — HOSPITAL ENCOUNTER (OUTPATIENT)
Dept: PHYSICAL THERAPY | Age: 68
Discharge: HOME | End: 2018-12-13
Attending: UROLOGY
Payer: MEDICARE

## 2018-12-13 DIAGNOSIS — C61 PROSTATE CANCER (HCC): ICD-10-CM

## 2018-12-13 PROCEDURE — 97161 PT EVAL LOW COMPLEX 20 MIN: CPT

## 2018-12-13 PROCEDURE — 97110 THERAPEUTIC EXERCISES: CPT

## 2018-12-13 PROCEDURE — G8988 SELF CARE GOAL STATUS: HCPCS

## 2018-12-13 PROCEDURE — G8987 SELF CARE CURRENT STATUS: HCPCS

## 2018-12-13 NOTE — THERAPY EVALUATION
Noe Rodriguez  : 1950  [place] / [place] / Product Type: Medicare /  [address]  at [facility]  [address] [place] [address]  [phone]   [phone]          OUTPATIENT PHYSICAL THERAPY:Initial Assessment and Daily Note 2018   ICD-10: Treatment Diagnosis: Lack of coordination (R27.8); generalized weakness (M62.81); cancer of the prostate (C61); Pelvic Pain (R10.2)  Precautions/Allergies:   Patient has no known allergies. MD Orders: Evaluate and treat MEDICAL/REFERRING DIAGNOSIS:  Prostate cancer ([address] Charisse Cobb   DATE OF ONSET: Rising PSA, Pelvic Pain since 2018  REFERRING PHYSICIAN: Pamela Guerra DO  RETURN PHYSICIAN APPOINTMENT: Surgery on 1/3/2019     INITIAL ASSESSMENT:  Mr. Wilfred Putnam presents with decreased coordination, weakness, and decreased endurance of pelvic floor muscles pre-operatively prostatectomy. Today in the clinic he was education on bladder health, Kegel exercises with biofeedback, precautions with catheter, pelvic floor anatomy. He was only able to contract his pelvic floor muscles for 1-2 seconds (due to excessive PMF tension) today in the clinic. He was given PFM coordination exercises to do at home prior to surgery and once the catheter is removed. He was educated on pain after surgery and precautions with Kegel exercises. He was able to demonstrate improved coordination by the end of session today. Pt would benefit from continued therapy to address the above issues. PROBLEM LIST (Impacting functional limitations):  1. Decreased Strength  2. Decreased ADL/Functional Activities  3. Increased Pain  4. Decreased Flexibility/Joint Mobility  5. Decreased Knowledge of Precautions  6. Decreased [place] with Home Exercise Program INTERVENTIONS PLANNED: (Treatment may consist of any combination of the following)  1. Heat  2. Manual Therapy  3.  Therapeutic Exercise/Strengthening   TREATMENT PLAN:  Effective Dates: 12/13/2018 TO 2/11/2019 (60 days). Frequency/Duration: 1 x pre-operatively, then 3 weeks after surgery (weekly) throughout POC  GOALS: (Goals have been discussed and agreed upon with patient.)  Short-Term Functional Goals: Time Frame: TODAY  1. Patient will verbalize rationale and purposes for exercises. (MET 12/13/2018)    Discharge Goals: Time Frame: 60 days  1. Pt with demonstrate normal voluntary relaxation of the pelvic floor muscle group to improve pelvic floor ROM and tolerate pain free sitting x 1 hour. 2. Pt will demonstrate 10 quick flicks of the pelvic floor muscle group, without compensation, to implement urge suppression appropriately with urgency of urination and decrease the number of pads used per day. 3. Pt will increase water intake by 16 oz and decrease irritant consumption by 8 oz to improve bladder health and decrease UI. Rehabilitation Potential For Stated Goals: Good     Regarding Jae Dolan's therapy, I certify that the treatment plan above will be carried out by a therapist or under their direction. Thank you for this referral,  Claudell Search, PT     Referring Physician Signature: Manisha Hernandez DO              Date                    The information in this section was collected on 12/13/2018 (except where otherwise noted). HISTORY:   History of Present Injury/Illness (Reason for Referral):  Urinary: Currently, patient denies UI, dysuria, or voiding dysfunction. Mild hesitancy with first void upon rising. Bowel: Due to anal fissure, sphincterotomy (5/2017), and hemorrhoid surgery, pt  Refuses to get constipated. Stool softeners used PRN. Generally reports having a painfree BM at least 1 x daily. Sexual: Not really sexually active at this time, but not long ago, used Viagra PRN with good success. Pelvic Organ Prolapse/Pelvic Pain: In 2/2017, pt began to experience deep anorectal pain.  No improvements with multiple surgeries then diagnosed with levator ani spasms. Pain improved with daily Flexeril. Has been pain free for 60 days, but very fearful that PFM retraining post-prostatectomy will bring back pain. Pain was severe, constant and limited sitting in any position. Past Medical History/Comorbidities:   Mr. Branden Keyes  has a past medical history of Cancer ([facility] [address] Elevated PSA, Hyperlipidemia, Hypertension, and Pyloric stenosis. Mr. Branden Keyes  has a past surgical history that includes hx hemorrhoidectomy; hx colonoscopy; hx tonsillectomy; hx vasectomy (1985); hx hemorrhoidectomy (1975, 1985); hx other surgical; hx other surgical (1985); and [place] (N/A, 5/9/2018). Social History/Living Environment:     Lives with wife  Prior Level of Function/Work/Activity:  Retired. Ambulatory/Rehab Services H2 Model Falls Risk Assessment    Risk Factors:       (1)  Gender [Male] Ability to Rise from Chair:       (0)  Ability to rise in a single movement    Falls Prevention Plan:       No modifications necessary   Total: (5 or greater = High Risk): 1    ©2010 [facility] [employer]. All Rights Reserved. [facility] Patent #[id number]. Federal Law prohibits the replication, distribution or use without written permission from [facility] [employer]     Current Medications:       Current Outpatient Medications:     metoprolol succinate (TOPROL XL) 50 mg XL tablet, Take 1.5 Tabs by mouth daily. , Disp: 135 Tab, Rfl: 0    pravastatin (PRAVACHOL) 80 mg tablet, Take 1 Tab by mouth nightly., Disp: 90 Tab, Rfl: 1    cyclobenzaprine (FLEXERIL) 10 mg tablet, TK ONE T PO Q 8 H PRF MUSCLE SPASMS-taking one daily, Disp: , Rfl: 2    DOCUSATE SODIUM PO, Take  by mouth daily. , Disp: , Rfl:     ibuprofen (MOTRIN) 200 mg tablet, Take 200 mg by mouth as needed for Pain. Indications: Pain, Disp: , Rfl:     cholecalciferol, VITAMIN D3, (VITAMIN D3) 5,000 unit tab tablet, Take 5,000 Units by mouth daily.  Indications: PREVENTION OF VITAMIN D DEFICIENCY, Disp: , Rfl:    Date Last Reviewed:  12/13/2018     Number of Personal Factors/Comorbidities that affect the Plan of Care: 0: LOW COMPLEXITY   EXAMINATION:   Strength:          PFM via SEMG (external): Standing resting tone 3-5 mV. Seated 2-3 mV. Able to perform anterior and posterior isolated small PFM contraction with mild delay in relaxation. Due to history of pelvic pain no holds performed. Diaphragmatic breathing impaired with PFM contraction and chest breathing noted. Body Structures Involved:  1. Muscles Body Functions Affected:  1. Neuromusculoskeletal  2. Movement Related Activities and Participation Affected:  1. General Tasks and Demands  2. Mobility  3. Self Care   Number of elements (examined above) that affect the Plan of Care: 3: MODERATE COMPLEXITY   CLINICAL PRESENTATION:   Presentation: Stable and uncomplicated: LOW COMPLEXITY   CLINICAL DECISION MAKING:   Outcome Measure: Tool Used: NIH - Chronic Prostatitis Symptom Index (NIH - CPSI for Males)   Score:  Initial:   Pain- 2  Urine- 1  QOL- 0 Most Recent: X (Date: -- )   Interpretation of Score:  Pain:  Score 0 1-4 5-8 9-12 13-16 17-20 21   Modifier CH CI CJ CK CL CM CN     Urinary Symptoms:  Score 0 1 2-3 4-5 6-7 8-9 10   Modifier CH CI CJ CK CL CM CN     Quality of Life Index:  Score 0 1-2 3-4 5-7 8-9 10-11 12   Modifier CH CI CJ CK CL CM CN       ? Self Care:     - CURRENT STATUS: CJ - 20%-39% impaired, limited or restricted Increased with Clinical Judgement (Due to history of pelvic pain and surgeries)    - GOAL STATUS: CI - 1%-19% impaired, limited or restricted    - D/C STATUS:  ---------------To be determined---------------       Medical Necessity:   · Patient is expected to demonstrate progress in strength, coordination and functional technique to eliminate pelvic pain and UI post-prostatectomy.   Reason for Services/Other Comments:  · Patient continues to require skilled intervention due to above mentioned deficits. Use of outcome tool(s) and clinical judgement create a POC that gives a: Clear prediction of patient's progress: LOW COMPLEXITY            TREATMENT:   (In addition to Assessment/Re-Assessment sessions the following treatments were rendered)    Pre-treatment Symptoms/Complaints:  Pt is eager to begin Pelvic PT. Pain: Initial:   Pain Intensity 1: 0 /10 Post Session:  0/10     THERAPEUTIC EXERCISE: (15 minutes):  Exercises per grid below to improve mobility, strength and coordination. Required moderate visual and verbal cues to promomte proper performance. Progressed resistance, repetitions and complexity of movement as indicated. Date:  12/13/2018     Activity/Exercise Parameters   Pelvic Floor Coordination With SEMG; anterior/posterior tilt, QF, emphasis on full relaxation (standing and seated)   Patient Education PFM anatomy, pathology, surgery precautions, bladder and bowel health, high tone PFM, downtraining   HEP Pre op: Diaphragmatic breathing, Child's pose, Happy Baby  Post op: 10 QF; 3 x daily     Treatment/Session Assessment:    · Response to Treatment:  Pt is a good candidate for skilled PT.  · Compliance with Program/Exercises: Will assess as treatment progresses. · Recommendations/Intent for next treatment session: \"Next visit will focus on advancements to more challenging activities\". SEMG with movement, downtraining, the knack, hip strength  Total Treatment Duration: Evaluation 40 minutes, Treatment 15 minutes  PT Patient Time In/Time Out  Time In: 0930  Time Out: [address], PT    Future Appointments   Date Time Provider Kevin Bird   12/18/2018  9:30 AM Porfirio Galindo DO [facility] PGU52 PGU   12/18/2018 11:15 AM [facility] ASSESSMENT  01 S[facility]PA [facility] OR PRE A   1/10/2019  2:20 PM Shannan Pillai DO [facility] PGU52 PGU   1/24/2019  9:30 AM Rochelle MCCRARY, PT [facility]   2/12/2019  9:15 AM Lorena Neely MD [facility] PRE PRE             .

## 2018-12-18 ENCOUNTER — ANESTHESIA EVENT (OUTPATIENT)
Dept: SURGERY | Age: 68
End: 2018-12-18
Payer: MEDICARE

## 2018-12-18 ENCOUNTER — HOSPITAL ENCOUNTER (OUTPATIENT)
Dept: SURGERY | Age: 68
Discharge: HOME | End: 2018-12-18
Payer: MEDICARE

## 2018-12-18 ENCOUNTER — HOSPITAL ENCOUNTER (OUTPATIENT)
Dept: GENERAL RADIOLOGY | Age: 68
Discharge: HOME | End: 2018-12-18
Attending: UROLOGY
Payer: MEDICARE

## 2018-12-18 VITALS
TEMPERATURE: 98.4 F | SYSTOLIC BLOOD PRESSURE: 143 MMHG | HEIGHT: 71 IN | HEART RATE: 65 BPM | OXYGEN SATURATION: 98 % | WEIGHT: 183.3 LBS | BODY MASS INDEX: 25.66 KG/M2 | DIASTOLIC BLOOD PRESSURE: 80 MMHG | RESPIRATION RATE: 18 BRPM

## 2018-12-18 LAB
ANION GAP SERPL CALC-SCNC: 5 MMOL/L (ref 7–16)
APPEARANCE UR: CLEAR
APTT PPP: 35.4 SEC (ref 24.7–39.8)
ATRIAL RATE: 61 BPM
BILIRUB UR QL: NEGATIVE
BUN SERPL-MCNC: 18 MG/DL (ref 8–23)
CALCIUM SERPL-MCNC: 9 MG/DL (ref 8.3–10.4)
CALCULATED P AXIS, ECG09: 63 DEGREES
CALCULATED R AXIS, ECG10: -5 DEGREES
CALCULATED T AXIS, ECG11: 137 DEGREES
CHLORIDE SERPL-SCNC: 105 MMOL/L (ref 98–107)
CO2 SERPL-SCNC: 29 MMOL/L (ref 21–32)
COLOR UR: YELLOW
CREAT SERPL-MCNC: 1.14 MG/DL (ref 0.8–1.5)
DIAGNOSIS, 93000: NORMAL
ERYTHROCYTE [DISTWIDTH] IN BLOOD BY AUTOMATED COUNT: 12.4 % (ref 11.9–14.6)
GLUCOSE SERPL-MCNC: 68 MG/DL (ref 65–100)
GLUCOSE UR STRIP.AUTO-MCNC: NEGATIVE MG/DL
HCT VFR BLD AUTO: 48 % (ref 41.1–50.3)
HGB BLD-MCNC: 16.2 G/DL (ref 13.6–17.2)
HGB UR QL STRIP: NEGATIVE
INR PPP: 1
KETONES UR QL STRIP.AUTO: NEGATIVE MG/DL
LEUKOCYTE ESTERASE UR QL STRIP.AUTO: NEGATIVE
MCH RBC QN AUTO: 32.3 PG (ref 26.1–32.9)
MCHC RBC AUTO-ENTMCNC: 33.8 G/DL (ref 31.4–35)
MCV RBC AUTO: 95.6 FL (ref 79.6–97.8)
NITRITE UR QL STRIP.AUTO: NEGATIVE
NRBC # BLD: 0 K/UL (ref 0–0.2)
P-R INTERVAL, ECG05: 150 MS
PH UR STRIP: 5.5 [PH] (ref 5–9)
PLATELET # BLD AUTO: 214 K/UL (ref 150–450)
PMV BLD AUTO: 11.1 FL (ref 9.4–12.3)
POTASSIUM SERPL-SCNC: 3.8 MMOL/L (ref 3.5–5.1)
PROT UR STRIP-MCNC: NEGATIVE MG/DL
PROTHROMBIN TIME: 13.1 SEC (ref 11.7–14.5)
Q-T INTERVAL, ECG07: 460 MS
QRS DURATION, ECG06: 88 MS
QTC CALCULATION (BEZET), ECG08: 463 MS
RBC # BLD AUTO: 5.02 M/UL (ref 4.23–5.6)
SODIUM SERPL-SCNC: 139 MMOL/L (ref 136–145)
SP GR UR REFRACTOMETRY: 1.01 (ref 1–1.02)
UROBILINOGEN UR QL STRIP.AUTO: 0.2 EU/DL (ref 0.2–1)
VENTRICULAR RATE, ECG03: 61 BPM
WBC # BLD AUTO: 7 K/UL (ref 4.3–11.1)

## 2018-12-18 PROCEDURE — 85610 PROTHROMBIN TIME: CPT

## 2018-12-18 PROCEDURE — 85027 COMPLETE CBC AUTOMATED: CPT

## 2018-12-18 PROCEDURE — 80048 BASIC METABOLIC PNL TOTAL CA: CPT

## 2018-12-18 PROCEDURE — 71046 X-RAY EXAM CHEST 2 VIEWS: CPT

## 2018-12-18 PROCEDURE — 85730 THROMBOPLASTIN TIME PARTIAL: CPT

## 2018-12-18 PROCEDURE — 93005 ELECTROCARDIOGRAM TRACING: CPT | Performed by: UROLOGY

## 2018-12-18 PROCEDURE — 87086 URINE CULTURE/COLONY COUNT: CPT

## 2018-12-18 PROCEDURE — 81003 URINALYSIS AUTO W/O SCOPE: CPT

## 2018-12-18 NOTE — PERIOP NOTES
Patient confirms name and . Order to obtain consent  found in EHR and  matches case posting. Type 3 surgery, PAT walk-in assessment complete. Labs per surgeon: CBC, BMP, PT/INR, PTT, UA and urine culture, EKG, and chest xray today. Type and screen signed and held for DOS. Labs per anesthesia protocol: none required  EKG:Patient denies CP, MI, or stents. States he regularly hikes long distances and reports hiking nine miles yesterday \"with no problem\". EKG and chest xray results reviewed and approved by Dr. Hank Mcburney. States patient will be seen by anesthesia day of surgery. No further orders given at this time. Glucose: not required    Rx list visualized today. Hibiclens and instructions given per hospital policy. Patient provided with and instructed on educational handouts including Guide to Surgery, Pain Management, Hand Hygiene, Blood Transfusion Education, and [facility] Brochure. Patient answered medical/surgical history questions at their best of ability. All prior to admission medications documented in [facility] Care. Patient instructed to continue previous medications as prescribed prior to surgery and to take the following medications the day of surgery according to anesthesia guidelines with a small sip of water: Toprol XL. Patient instructed to hold all vitamins 7 days prior to surgery and NSAIDS 5 days prior to surgery, patient verbalized understanding. Medications to be held: Ibuprofen- 5 days    Patient instructed on the following:  Arrive at [facility], time of arrival to be called the day before by 1700  NPO after midnight including gum, mints, and ice chips. Responsible adult must drive patient to the hospital, stay during surgery, and patient will require supervision 24 hours after anesthesia. Use antibacterial soap and hibiclens in shower the night before surgery and on the morning of surgery.   Leave all valuables (money and jewelry) at home but bring insurance card and ID on       DOS. Do not wear make-up, nail polish, lotions, cologne, perfumes, powders, or oil on skin. Patient teach back successful and patient demonstrates knowledge of instruction.

## 2018-12-18 NOTE — PERIOP NOTES
Recent Results (from the past 12 hour(s))   CBC W/O DIFF    Collection Time: 12/18/18 12:46 PM   Result Value Ref Range    WBC 7.0 4.3 - 11.1 K/uL    RBC 5.02 4.23 - 5.6 M/uL    HGB 16.2 13.6 - 17.2 g/dL    HCT 48.0 41.1 - 50.3 %    MCV 95.6 79.6 - 97.8 FL    MCH 32.3 26.1 - 32.9 PG    MCHC 33.8 31.4 - 35.0 g/dL    RDW 12.4 11.9 - 14.6 %    PLATELET 381 656 - 879 K/uL    MPV 11.1 9.4 - 12.3 FL    ABSOLUTE NRBC 0.00 0.0 - 0.2 K/uL   METABOLIC PANEL, BASIC    Collection Time: 12/18/18 12:46 PM   Result Value Ref Range    Sodium 139 136 - 145 mmol/L    Potassium 3.8 3.5 - 5.1 mmol/L    Chloride 105 98 - 107 mmol/L    CO2 29 21 - 32 mmol/L    Anion gap 5 (L) 7 - 16 mmol/L    Glucose 68 65 - 100 mg/dL    BUN 18 8 - 23 MG/DL    Creatinine 1.14 0.8 - 1.5 MG/DL    GFR est AA >60 >60 ml/min/1.73m2    GFR est non-AA >60 >60 ml/min/1.73m2    Calcium 9.0 8.3 - 10.4 MG/DL   PROTHROMBIN TIME + INR    Collection Time: 12/18/18 12:46 PM   Result Value Ref Range    Prothrombin time 13.1 11.7 - 14.5 sec    INR 1.0     PTT    Collection Time: 12/18/18 12:46 PM   Result Value Ref Range    aPTT 35.4 24.7 - 39.8 SEC   URINALYSIS W/ RFLX MICROSCOPIC    Collection Time: 12/18/18 12:46 PM   Result Value Ref Range    Color YELLOW      Appearance CLEAR      Specific gravity 1.012 1.001 - 1.023      pH (UA) 5.5 5.0 - 9.0      Protein NEGATIVE  NEG mg/dL    Glucose NEGATIVE  mg/dL    Ketone NEGATIVE  NEG mg/dL    Bilirubin NEGATIVE  NEG      Blood NEGATIVE  NEG      Urobilinogen 0.2 0.2 - 1.0 EU/dL    Nitrites NEGATIVE  NEG      Leukocyte Esterase NEGATIVE  NEG       Labs reviewed and forwarded to Dr. Elliott Goodson. No further action required. Chart flagged for charge nurse for pending urine culture.

## 2018-12-20 LAB
BACTERIA SPEC CULT: NORMAL
SERVICE CMNT-IMP: NORMAL

## 2019-01-03 ENCOUNTER — ANESTHESIA (OUTPATIENT)
Dept: SURGERY | Age: 69
End: 2019-01-03
Payer: MEDICARE

## 2019-01-03 ENCOUNTER — HOSPITAL ENCOUNTER (OUTPATIENT)
Age: 69
Discharge: HOME | End: 2019-01-04
Attending: UROLOGY | Admitting: UROLOGY
Payer: MEDICARE

## 2019-01-03 ENCOUNTER — APPOINTMENT (RX ONLY)
Dept: URBAN - METROPOLITAN AREA CLINIC 349 | Facility: CLINIC | Age: 69
Setting detail: DERMATOLOGY
End: 2019-01-03

## 2019-01-03 DIAGNOSIS — Z90.79 S/P PROSTATECTOMY: Primary | ICD-10-CM

## 2019-01-03 PROBLEM — C61 PROSTATE CANCER (HCC): Status: ACTIVE | Noted: 2019-01-03

## 2019-01-03 LAB
ABO + RH BLD: NORMAL
BLOOD GROUP ANTIBODIES SERPL: NORMAL
HCT VFR BLD AUTO: 45.6 % (ref 41.1–50.3)
HGB BLD-MCNC: 15.2 G/DL (ref 13.6–17.2)
SPECIMEN EXP DATE BLD: NORMAL

## 2019-01-03 PROCEDURE — 85018 HEMOGLOBIN: CPT

## 2019-01-03 PROCEDURE — 77030034696 HC CATH URETH FOL 2W BARD -A: Performed by: UROLOGY

## 2019-01-03 PROCEDURE — 77030012411 HC DRN WND CARD -A: Performed by: UROLOGY

## 2019-01-03 PROCEDURE — 77030016151 HC PROTCTR LNS DFOG COVD -B: Performed by: UROLOGY

## 2019-01-03 PROCEDURE — 77030020703 HC SEAL CANN DISP INTU -B: Performed by: UROLOGY

## 2019-01-03 PROCEDURE — 77030037088 HC TUBE ENDOTRACH ORAL NSL COVD-A: Performed by: ANESTHESIOLOGY

## 2019-01-03 PROCEDURE — 76010000876 HC OR TIME 2 TO 2.5HR INTENSV - TIER 2: Performed by: UROLOGY

## 2019-01-03 PROCEDURE — 77030008522 HC TBNG INSUF LAPRO STRY -B: Performed by: UROLOGY

## 2019-01-03 PROCEDURE — 77030018846 HC SOL IRR STRL H20 ICUM -A: Performed by: UROLOGY

## 2019-01-03 PROCEDURE — 74011250636 HC RX REV CODE- 250/636

## 2019-01-03 PROCEDURE — 74011250637 HC RX REV CODE- 250/637: Performed by: UROLOGY

## 2019-01-03 PROCEDURE — 77030035277 HC OBTRTR BLDELSS DISP INTU -B: Performed by: UROLOGY

## 2019-01-03 PROCEDURE — 77030013567 HC DRN WND RESERV BARD -A: Performed by: UROLOGY

## 2019-01-03 PROCEDURE — 74011250636 HC RX REV CODE- 250/636: Performed by: ANESTHESIOLOGY

## 2019-01-03 PROCEDURE — 77030002933 HC SUT MCRYL J&J -A: Performed by: UROLOGY

## 2019-01-03 PROCEDURE — 77030002916 HC SUT ETHLN J&J -A: Performed by: UROLOGY

## 2019-01-03 PROCEDURE — 77030003575 HC NDL INSUF ENDOPTH J&J -B: Performed by: UROLOGY

## 2019-01-03 PROCEDURE — 77030031139 HC SUT VCRL2 J&J -A: Performed by: UROLOGY

## 2019-01-03 PROCEDURE — 77030035045 HC TRCR ENDOSC VRSPRT BLDLSS COVD -B: Performed by: UROLOGY

## 2019-01-03 PROCEDURE — 76210000017 HC OR PH I REC 1.5 TO 2 HR: Performed by: UROLOGY

## 2019-01-03 PROCEDURE — 77030019908 HC STETH ESOPH SIMS -A: Performed by: ANESTHESIOLOGY

## 2019-01-03 PROCEDURE — 86900 BLOOD TYPING SEROLOGIC ABO: CPT

## 2019-01-03 PROCEDURE — 77010033678 HC OXYGEN DAILY

## 2019-01-03 PROCEDURE — 74011250636 HC RX REV CODE- 250/636: Performed by: UROLOGY

## 2019-01-03 PROCEDURE — 77030037171 HC CLP LAPRO ABS SGL COVD -B: Performed by: UROLOGY

## 2019-01-03 PROCEDURE — 77030039425 HC BLD LARYNG TRULITE DISP TELE -A: Performed by: ANESTHESIOLOGY

## 2019-01-03 PROCEDURE — 74011000250 HC RX REV CODE- 250: Performed by: UROLOGY

## 2019-01-03 PROCEDURE — 77030008603 HC TRCR ENDOSC EPATH J&J -C: Performed by: UROLOGY

## 2019-01-03 PROCEDURE — 76060000035 HC ANESTHESIA 2 TO 2.5 HR: Performed by: UROLOGY

## 2019-01-03 PROCEDURE — 77030008756 HC TU IRR SUC STRY -B: Performed by: UROLOGY

## 2019-01-03 PROCEDURE — 77030009852 HC PCH RTVR ENDOSC COVD -B: Performed by: UROLOGY

## 2019-01-03 PROCEDURE — 74011000258 HC RX REV CODE- 258: Performed by: UROLOGY

## 2019-01-03 PROCEDURE — 88307 TISSUE EXAM BY PATHOLOGIST: CPT

## 2019-01-03 PROCEDURE — 77030020782 HC GWN BAIR PAWS FLX 3M -B: Performed by: ANESTHESIOLOGY

## 2019-01-03 PROCEDURE — 74011000250 HC RX REV CODE- 250

## 2019-01-03 PROCEDURE — 77030020263 HC SOL INJ SOD CL0.9% LFCR 1000ML: Performed by: UROLOGY

## 2019-01-03 PROCEDURE — 77030000038 HC TIP SCIS LAPSCP SURI -B: Performed by: UROLOGY

## 2019-01-03 PROCEDURE — 77030032490 HC SLV COMPR SCD KNE COVD -B: Performed by: UROLOGY

## 2019-01-03 PROCEDURE — 77030010545: Performed by: UROLOGY

## 2019-01-03 PROCEDURE — 36415 COLL VENOUS BLD VENIPUNCTURE: CPT

## 2019-01-03 PROCEDURE — 77030039266 HC ADH SKN EXOFIN S2SG -A: Performed by: UROLOGY

## 2019-01-03 RX ORDER — CYCLOBENZAPRINE HCL 10 MG
10 TABLET ORAL
Status: DISCONTINUED | OUTPATIENT
Start: 2019-01-03 | End: 2019-01-04 | Stop reason: HOSPADM

## 2019-01-03 RX ORDER — DOCUSATE SODIUM 100 MG/1
100 CAPSULE, LIQUID FILLED ORAL DAILY
Status: DISCONTINUED | OUTPATIENT
Start: 2019-01-03 | End: 2019-01-03

## 2019-01-03 RX ORDER — ACETAMINOPHEN 325 MG/1
650 TABLET ORAL
Status: DISCONTINUED | OUTPATIENT
Start: 2019-01-03 | End: 2019-01-04 | Stop reason: HOSPADM

## 2019-01-03 RX ORDER — ONDANSETRON 2 MG/ML
INJECTION INTRAMUSCULAR; INTRAVENOUS AS NEEDED
Status: DISCONTINUED | OUTPATIENT
Start: 2019-01-03 | End: 2019-01-03 | Stop reason: HOSPADM

## 2019-01-03 RX ORDER — ATROPA BELLADONNA AND OPIUM 16.2; 6 MG/1; MG/1
1 SUPPOSITORY RECTAL
Status: ACTIVE | OUTPATIENT
Start: 2019-01-03 | End: 2019-01-04

## 2019-01-03 RX ORDER — ONDANSETRON 2 MG/ML
4 INJECTION INTRAMUSCULAR; INTRAVENOUS
Status: DISCONTINUED | OUTPATIENT
Start: 2019-01-03 | End: 2019-01-04 | Stop reason: HOSPADM

## 2019-01-03 RX ORDER — ONDANSETRON 2 MG/ML
4 INJECTION INTRAMUSCULAR; INTRAVENOUS ONCE
Status: COMPLETED | OUTPATIENT
Start: 2019-01-03 | End: 2019-01-03

## 2019-01-03 RX ORDER — CEFAZOLIN SODIUM/WATER 2 G/20 ML
2 SYRINGE (ML) INTRAVENOUS
Status: COMPLETED | OUTPATIENT
Start: 2019-01-03 | End: 2019-01-03

## 2019-01-03 RX ORDER — FENTANYL CITRATE 50 UG/ML
100 INJECTION, SOLUTION INTRAMUSCULAR; INTRAVENOUS AS NEEDED
Status: DISCONTINUED | OUTPATIENT
Start: 2019-01-03 | End: 2019-01-03 | Stop reason: HOSPADM

## 2019-01-03 RX ORDER — PRAVASTATIN SODIUM 20 MG/1
80 TABLET ORAL
Status: DISCONTINUED | OUTPATIENT
Start: 2019-01-03 | End: 2019-01-04 | Stop reason: HOSPADM

## 2019-01-03 RX ORDER — LIDOCAINE HYDROCHLORIDE 20 MG/ML
INJECTION, SOLUTION EPIDURAL; INFILTRATION; INTRACAUDAL; PERINEURAL AS NEEDED
Status: DISCONTINUED | OUTPATIENT
Start: 2019-01-03 | End: 2019-01-03 | Stop reason: HOSPADM

## 2019-01-03 RX ORDER — OXYBUTYNIN CHLORIDE 5 MG/1
5 TABLET ORAL
Status: DISCONTINUED | OUTPATIENT
Start: 2019-01-03 | End: 2019-01-04 | Stop reason: HOSPADM

## 2019-01-03 RX ORDER — MORPHINE SULFATE 2 MG/ML
2 INJECTION, SOLUTION INTRAMUSCULAR; INTRAVENOUS
Status: DISCONTINUED | OUTPATIENT
Start: 2019-01-03 | End: 2019-01-04 | Stop reason: HOSPADM

## 2019-01-03 RX ORDER — ACETAMINOPHEN 500 MG
500 TABLET ORAL ONCE
Status: DISCONTINUED | OUTPATIENT
Start: 2019-01-03 | End: 2019-01-03 | Stop reason: HOSPADM

## 2019-01-03 RX ORDER — DEXAMETHASONE SODIUM PHOSPHATE 4 MG/ML
INJECTION, SOLUTION INTRA-ARTICULAR; INTRALESIONAL; INTRAMUSCULAR; INTRAVENOUS; SOFT TISSUE AS NEEDED
Status: DISCONTINUED | OUTPATIENT
Start: 2019-01-03 | End: 2019-01-03 | Stop reason: HOSPADM

## 2019-01-03 RX ORDER — NALOXONE HYDROCHLORIDE 0.4 MG/ML
0.1 INJECTION, SOLUTION INTRAMUSCULAR; INTRAVENOUS; SUBCUTANEOUS AS NEEDED
Status: DISCONTINUED | OUTPATIENT
Start: 2019-01-03 | End: 2019-01-03 | Stop reason: HOSPADM

## 2019-01-03 RX ORDER — SODIUM CHLORIDE, SODIUM LACTATE, POTASSIUM CHLORIDE, CALCIUM CHLORIDE 600; 310; 30; 20 MG/100ML; MG/100ML; MG/100ML; MG/100ML
75 INJECTION, SOLUTION INTRAVENOUS CONTINUOUS
Status: DISCONTINUED | OUTPATIENT
Start: 2019-01-03 | End: 2019-01-03 | Stop reason: HOSPADM

## 2019-01-03 RX ORDER — MIDAZOLAM HYDROCHLORIDE 1 MG/ML
2 INJECTION, SOLUTION INTRAMUSCULAR; INTRAVENOUS
Status: COMPLETED | OUTPATIENT
Start: 2019-01-03 | End: 2019-01-03

## 2019-01-03 RX ORDER — OXYCODONE HYDROCHLORIDE 5 MG/1
10 TABLET ORAL
Status: DISCONTINUED | OUTPATIENT
Start: 2019-01-03 | End: 2019-01-03 | Stop reason: HOSPADM

## 2019-01-03 RX ORDER — LIDOCAINE HYDROCHLORIDE 10 MG/ML
0.1 INJECTION INFILTRATION; PERINEURAL AS NEEDED
Status: DISCONTINUED | OUTPATIENT
Start: 2019-01-03 | End: 2019-01-03 | Stop reason: HOSPADM

## 2019-01-03 RX ORDER — FENTANYL CITRATE 50 UG/ML
INJECTION, SOLUTION INTRAMUSCULAR; INTRAVENOUS AS NEEDED
Status: DISCONTINUED | OUTPATIENT
Start: 2019-01-03 | End: 2019-01-03 | Stop reason: HOSPADM

## 2019-01-03 RX ORDER — NEOSTIGMINE METHYLSULFATE 1 MG/ML
INJECTION INTRAVENOUS AS NEEDED
Status: DISCONTINUED | OUTPATIENT
Start: 2019-01-03 | End: 2019-01-03 | Stop reason: HOSPADM

## 2019-01-03 RX ORDER — ZOLPIDEM TARTRATE 5 MG/1
5 TABLET ORAL
Status: DISCONTINUED | OUTPATIENT
Start: 2019-01-03 | End: 2019-01-04 | Stop reason: HOSPADM

## 2019-01-03 RX ORDER — PROPOFOL 10 MG/ML
INJECTION, EMULSION INTRAVENOUS AS NEEDED
Status: DISCONTINUED | OUTPATIENT
Start: 2019-01-03 | End: 2019-01-03 | Stop reason: HOSPADM

## 2019-01-03 RX ORDER — DIPHENHYDRAMINE HYDROCHLORIDE 50 MG/ML
12.5 INJECTION, SOLUTION INTRAMUSCULAR; INTRAVENOUS
Status: DISCONTINUED | OUTPATIENT
Start: 2019-01-03 | End: 2019-01-04 | Stop reason: HOSPADM

## 2019-01-03 RX ORDER — ROCURONIUM BROMIDE 10 MG/ML
INJECTION, SOLUTION INTRAVENOUS AS NEEDED
Status: DISCONTINUED | OUTPATIENT
Start: 2019-01-03 | End: 2019-01-03 | Stop reason: HOSPADM

## 2019-01-03 RX ORDER — DOCUSATE SODIUM 100 MG/1
100 CAPSULE, LIQUID FILLED ORAL 2 TIMES DAILY
Status: DISCONTINUED | OUTPATIENT
Start: 2019-01-03 | End: 2019-01-03

## 2019-01-03 RX ORDER — HYDROMORPHONE HYDROCHLORIDE 2 MG/ML
0.5 INJECTION, SOLUTION INTRAMUSCULAR; INTRAVENOUS; SUBCUTANEOUS
Status: DISCONTINUED | OUTPATIENT
Start: 2019-01-03 | End: 2019-01-03 | Stop reason: HOSPADM

## 2019-01-03 RX ORDER — OXYCODONE HYDROCHLORIDE 5 MG/1
5 TABLET ORAL
Status: DISCONTINUED | OUTPATIENT
Start: 2019-01-03 | End: 2019-01-03 | Stop reason: HOSPADM

## 2019-01-03 RX ORDER — HYDROCODONE BITARTRATE AND ACETAMINOPHEN 7.5; 325 MG/1; MG/1
1 TABLET ORAL
Status: DISCONTINUED | OUTPATIENT
Start: 2019-01-03 | End: 2019-01-04 | Stop reason: HOSPADM

## 2019-01-03 RX ORDER — SODIUM CHLORIDE, SODIUM LACTATE, POTASSIUM CHLORIDE, CALCIUM CHLORIDE 600; 310; 30; 20 MG/100ML; MG/100ML; MG/100ML; MG/100ML
1000 INJECTION, SOLUTION INTRAVENOUS CONTINUOUS
Status: DISCONTINUED | OUTPATIENT
Start: 2019-01-03 | End: 2019-01-03 | Stop reason: HOSPADM

## 2019-01-03 RX ORDER — DOCUSATE SODIUM 100 MG/1
100 CAPSULE, LIQUID FILLED ORAL 2 TIMES DAILY
Status: DISCONTINUED | OUTPATIENT
Start: 2019-01-03 | End: 2019-01-04 | Stop reason: HOSPADM

## 2019-01-03 RX ORDER — GLYCOPYRROLATE 0.2 MG/ML
INJECTION INTRAMUSCULAR; INTRAVENOUS AS NEEDED
Status: DISCONTINUED | OUTPATIENT
Start: 2019-01-03 | End: 2019-01-03 | Stop reason: HOSPADM

## 2019-01-03 RX ORDER — DIPHENHYDRAMINE HYDROCHLORIDE 50 MG/ML
12.5 INJECTION, SOLUTION INTRAMUSCULAR; INTRAVENOUS ONCE
Status: DISCONTINUED | OUTPATIENT
Start: 2019-01-03 | End: 2019-01-03 | Stop reason: HOSPADM

## 2019-01-03 RX ORDER — BUPIVACAINE HYDROCHLORIDE 2.5 MG/ML
INJECTION, SOLUTION EPIDURAL; INFILTRATION; INTRACAUDAL AS NEEDED
Status: DISCONTINUED | OUTPATIENT
Start: 2019-01-03 | End: 2019-01-03 | Stop reason: HOSPADM

## 2019-01-03 RX ORDER — DEXTROSE MONOHYDRATE AND SODIUM CHLORIDE 5; .45 G/100ML; G/100ML
150 INJECTION, SOLUTION INTRAVENOUS CONTINUOUS
Status: DISCONTINUED | OUTPATIENT
Start: 2019-01-03 | End: 2019-01-04

## 2019-01-03 RX ADMIN — SODIUM CHLORIDE 1000 MG: 900 INJECTION, SOLUTION INTRAVENOUS at 16:22

## 2019-01-03 RX ADMIN — DOCUSATE SODIUM 100 MG: 100 CAPSULE, LIQUID FILLED ORAL at 16:22

## 2019-01-03 RX ADMIN — DEXAMETHASONE SODIUM PHOSPHATE 4 MG: 4 INJECTION, SOLUTION INTRA-ARTICULAR; INTRALESIONAL; INTRAMUSCULAR; INTRAVENOUS; SOFT TISSUE at 08:19

## 2019-01-03 RX ADMIN — LIDOCAINE HYDROCHLORIDE 100 MG: 20 INJECTION, SOLUTION EPIDURAL; INFILTRATION; INTRACAUDAL; PERINEURAL at 07:47

## 2019-01-03 RX ADMIN — HYDROMORPHONE HYDROCHLORIDE 0.5 MG: 2 INJECTION, SOLUTION INTRAMUSCULAR; INTRAVENOUS; SUBCUTANEOUS at 11:16

## 2019-01-03 RX ADMIN — HYDROCODONE BITARTRATE AND ACETAMINOPHEN 1 TABLET: 7.5; 325 TABLET ORAL at 21:57

## 2019-01-03 RX ADMIN — PROPOFOL 200 MG: 10 INJECTION, EMULSION INTRAVENOUS at 07:47

## 2019-01-03 RX ADMIN — ROCURONIUM BROMIDE 10 MG: 10 INJECTION, SOLUTION INTRAVENOUS at 08:14

## 2019-01-03 RX ADMIN — Medication 2 G: at 07:55

## 2019-01-03 RX ADMIN — PRAVASTATIN SODIUM 80 MG: 20 TABLET ORAL at 21:26

## 2019-01-03 RX ADMIN — HYDROMORPHONE HYDROCHLORIDE 0.5 MG: 2 INJECTION, SOLUTION INTRAMUSCULAR; INTRAVENOUS; SUBCUTANEOUS at 10:20

## 2019-01-03 RX ADMIN — GLYCOPYRROLATE 0.4 MG: 0.2 INJECTION INTRAMUSCULAR; INTRAVENOUS at 09:31

## 2019-01-03 RX ADMIN — SODIUM CHLORIDE, SODIUM LACTATE, POTASSIUM CHLORIDE, AND CALCIUM CHLORIDE 1000 ML: 600; 310; 30; 20 INJECTION, SOLUTION INTRAVENOUS at 06:46

## 2019-01-03 RX ADMIN — ROCURONIUM BROMIDE 40 MG: 10 INJECTION, SOLUTION INTRAVENOUS at 07:48

## 2019-01-03 RX ADMIN — ROCURONIUM BROMIDE 5 MG: 10 INJECTION, SOLUTION INTRAVENOUS at 09:10

## 2019-01-03 RX ADMIN — FENTANYL CITRATE 25 MCG: 50 INJECTION, SOLUTION INTRAMUSCULAR; INTRAVENOUS at 09:30

## 2019-01-03 RX ADMIN — SODIUM CHLORIDE, SODIUM LACTATE, POTASSIUM CHLORIDE, AND CALCIUM CHLORIDE: 600; 310; 30; 20 INJECTION, SOLUTION INTRAVENOUS at 09:00

## 2019-01-03 RX ADMIN — FENTANYL CITRATE 100 MCG: 50 INJECTION, SOLUTION INTRAMUSCULAR; INTRAVENOUS at 07:48

## 2019-01-03 RX ADMIN — DEXTROSE MONOHYDRATE AND SODIUM CHLORIDE 150 ML/HR: 5; .45 INJECTION, SOLUTION INTRAVENOUS at 14:05

## 2019-01-03 RX ADMIN — ONDANSETRON 4 MG: 2 INJECTION INTRAMUSCULAR; INTRAVENOUS at 10:25

## 2019-01-03 RX ADMIN — ROCURONIUM BROMIDE 10 MG: 10 INJECTION, SOLUTION INTRAVENOUS at 08:30

## 2019-01-03 RX ADMIN — ONDANSETRON 4 MG: 2 INJECTION INTRAMUSCULAR; INTRAVENOUS at 14:22

## 2019-01-03 RX ADMIN — ONDANSETRON 4 MG: 2 INJECTION INTRAMUSCULAR; INTRAVENOUS at 09:31

## 2019-01-03 RX ADMIN — MIDAZOLAM HYDROCHLORIDE 2 MG: 2 INJECTION, SOLUTION INTRAMUSCULAR; INTRAVENOUS at 07:37

## 2019-01-03 RX ADMIN — FENTANYL CITRATE 25 MCG: 50 INJECTION, SOLUTION INTRAMUSCULAR; INTRAVENOUS at 09:32

## 2019-01-03 RX ADMIN — SODIUM CHLORIDE 1000 MG: 900 INJECTION, SOLUTION INTRAVENOUS at 23:35

## 2019-01-03 RX ADMIN — DEXTROSE MONOHYDRATE AND SODIUM CHLORIDE 150 ML/HR: 5; .45 INJECTION, SOLUTION INTRAVENOUS at 21:26

## 2019-01-03 RX ADMIN — NEOSTIGMINE METHYLSULFATE 3 MG: 1 INJECTION INTRAVENOUS at 09:31

## 2019-01-03 NOTE — PROGRESS NOTES
Patient has not passed flatus this shift. Incisions intact and GEORGE drain is charged and draining sanginous fluid at this time. Output on GEORGE drain this shift is 30 mls. Cooper is intact, patent, and draining red hazy fluid at this time. Cooper catheter output 500. Assisted patient to ambulate and patient experienced nausea. Patient has ambulated with wife since attempt and tolerated well. Hourly rounds performed this shift. Bed lowered and locked, side rails x2, and call light in reach. All patient needs are met at this time. Will continue to monitor and give bedside shift report to oncoming nurse.

## 2019-01-03 NOTE — ANESTHESIA POSTPROCEDURE EVALUATION
Procedure(s): PROSTATECTOMY ROBOTIC ASSISTED WITHOUT LYMPH NODE DISSECTION. Anesthesia Post Evaluation Multimodal analgesia: multimodal analgesia used between 6 hours prior to anesthesia start to PACU discharge Patient location during evaluation: bedside Patient participation: complete - patient participated Level of consciousness: awake and alert Pain management: adequate Airway patency: patent Anesthetic complications: no 
Cardiovascular status: hemodynamically stable Respiratory status: spontaneous ventilation Hydration status: euvolemic Comments: Patient stable and may discharge at this time. Visit Vitals /79 Pulse (!) 54 Temp 36.7 °C (98.1 °F) Resp 16 Ht 5' 11\" (1.803 m) Wt 82.3 kg (181 lb 8 oz) SpO2 96% BMI 25.31 kg/m²

## 2019-01-03 NOTE — PROGRESS NOTES
Chart screened by  for discharge planning. No needs identified at this time. Please consult  if any new issues arise. Care Management Interventions PCP Verified by CM: Yes Mode of Transport at Discharge: Other (see comment) Transition of Care Consult (CM Consult): Discharge Planning Discharge Durable Medical Equipment: No 
Physical Therapy Consult: No 
Occupational Therapy Consult: No 
Current Support Network: Own Home Confirm Follow Up Transport: Self Plan discussed with Pt/Family/Caregiver: Yes Freedom of Choice Offered: Yes Discharge Location Discharge Placement: Home

## 2019-01-03 NOTE — BRIEF OP NOTE
BRIEF OPERATIVE NOTE Date of Procedure: 1/3/2019 Preoperative Diagnosis: Prostate cancer ([facility] [address] Gawillian Nitesh Postoperative Diagnosis: Prostate cancer ([facility] [address] Perez Dowling Procedure(s): LAP RADICAL PROSTATECTOMY ROBOTIC ASSISTED Surgeon(s) and Role: * Angella Pillai,  - Primary Surgical Assistant: None Surgical Staff: 
Circ-1: Jamaica Sawyer RN 
Circ-2: Shae Silva RN 
Circ-Relief: Cande Lemon RN Scrub Tech-1: Yelena Bhatti Scrub Tech-2: Darylene Hubert Event Time In Time Out Incision Start 3084 Incision Close 6011 Anesthesia: General  
Estimated Blood Loss: 50cc Specimens:  
ID Type Source Tests Collected by Time Destination 1 : PROSTATE Fresh Prostate  Lodema Grout, DO 1/3/2019 2014 Pathology Findings: see op note Complications: none immediate Implants: * No implants in log *

## 2019-01-03 NOTE — PROGRESS NOTES
Primary Nurse Danyell Elizondo and Murphy Bear, RN performed a dual skin assessment on this patient. Impairment noted- see wound doc flow sheet. Cooper intact and draining. Patient has s/p prostatectomy incisions sites x5 with glue intact. EGORGE draining to right lower quad. Redness noted to top of patient's head. Abrasion area to LLE. All other skin is clean, dry, and intact. Will continue to monitor patient at this time.

## 2019-01-03 NOTE — PERIOP NOTES
Dr. Ofe Posey at bedside, received verbal order to update consent order to read \"without\" lymph node dissection; with updated consent, consent and H&P match. Hector Alejo at [facility] notified of status board reading \"possible. \"

## 2019-01-03 NOTE — ANESTHESIA PREPROCEDURE EVALUATION
Anesthetic History No history of anesthetic complications Comments: Please place some gauze in mouth to protect tongue , he bit it badly last time Review of Systems / Medical History Patient summary reviewed and pertinent labs reviewed Pulmonary Within defined limits Neuro/Psych Within defined limits Cardiovascular Hypertension: well controlled Hyperlipidemia Exercise tolerance: >4 METS 
  
GI/Hepatic/Renal 
  
GERD: well controlled Endo/Other Cancer (prostate, for surgery today.) Other Findings Physical Exam 
 
Airway Mallampati: II 
TM Distance: 4 - 6 cm Neck ROM: normal range of motion Mouth opening: Normal 
 
 Cardiovascular Rhythm: regular Rate: normal 
 
 
 
 Dental 
 
Dentition: Caps/crowns Pulmonary Breath sounds clear to auscultation Abdominal 
 
 
 
 Other Findings Anesthetic Plan ASA: 2 Anesthesia type: general 
 
 
 
 
Induction: Intravenous Anesthetic plan and risks discussed with: Patient and Spouse

## 2019-01-03 NOTE — PROGRESS NOTES
Mr. Twin Ramon was resting during my visit but I was able to visit with his wife Sarah Beth Guy and she expressed appreciation for the visit. No spiritual/emotional concerns were voiced. Barbaraann Curling, MDiv Board Certified [employer]

## 2019-01-03 NOTE — OP NOTES
[facility]  MR#: [id number]  : 1950  ACCOUNT #: [de-identified]   DATE OF SERVICE: 2019    PREOPERATIVE DIAGNOSIS:  Prostate cancer. POSTOPERATIVE DIAGNOSIS:  Prostate cancer. PROCEDURE PERFORMED:  Robotic-assisted laparoscopic radical prostatectomy. SURGEON:  Severiano Pippin, DO    ANESTHESIA:  General.    ESTIMATED BLOOD LOSS:  50 mL    ASSISTANT:  None. COMPLICATIONS:  None immediate. IMPLANTS:  None. SPECIMENS REMOVED:  Prostate. CLINICAL HISTORY:  This is a 27-year-old gentleman who was recently diagnosed with Pieter 6 adenocarcinoma of the prostate on 2018. His pre-biopsy PSA was 5, and clinical stage is T1c. All risks, benefits and alternatives to the above-mentioned procedure have been reviewed, and he was willing to proceed at this time. DESCRIPTION OF OPERATIVE PROCEDURE:  Patient consent was obtained. The patient was brought back to the operating room at which time he was placed in the supine position. After the uneventful induction of general anesthesia, he was then placed in the low lithotomy position. His genital and abdominal areas were prepped and draped and a sterile field applied. An 18-Slovenian Cooper catheter was placed to dependent drainage. A small periumbilical incision was made, and the Veress needle was inserted into the abdominal cavity without event. The abdomen was then insufflated with CO2. The abdominal cavity was surveyed using the laparoscope. Some upper abdominal adhesions were noted; however, the lower abdomen was free of adhesions. All ports were then placed in the standard robotic prostatectomy fashion under direct vision. The patient was then placed in a steep Trendelenburg position, and the robot was docked. The space of Retzius was entered by dropping the bladder posteriorly. The endopelvic fascia was incised bilaterally.   The dorsal venous complex was dissected out and oversewn using 2-0 Vicryl in a figure-of-eight fashion. A back stitch was also placed near the bladder neck. The bladder neck was then transected from the prostate using electrocautery. The catheter was identified in the midline and retracted anteriorly to aid in posterior dissection. Following division of the posterior bladder neck, dissection then carried posteriorly to the seminal vesicles and vas deferens bilaterally. These structures were dissected out and divided. A bilateral retrograde nerve-sparing procedure was then performed using athermal technique. Pedicles were controlled using Lapro-Clips, once again utilizing an athermal technique. The neurovascular bundles and the rectum were then swept off the prostate bluntly in an antegrade fashion. The dorsal vein was divided using electrocautery, and the urethra was transected using the robotic scissors. The prostate was then placed within an EndoCatch bag and placed within the lower pelvis. The pelvis was thoroughly irrigated. No active bleeding was seen. The urethrovesical anastomosis was accomplished using 3-0 double-arm Monocryl in a running fashion. A 20-Chinese Cooper catheter was placed at the completion of this anastomosis. The bladder was thoroughly irrigated, and no obvious leak was seen. The bladder was then tacked anteriorly to the endopelvic fascia bilaterally using 3-0 Vicryl in a simple interrupted fashion. The intra-abdominal pressure was then lowered. No bleeding was seen. No injury was noted in the lower pelvis. The intra-abdominal pressures were then raised. The robot was then docked, and the prostate was removed through the periumbilical incision. A 15-GEORGE drain was placed through the 5 mm port site and sewn in place using 2-0 nylon suture. The midline fascia was closed using 0 Vicryl in a running fashion. Skin was closed using 4-0 Monocryl in a running subcuticular fashion.   All other port sites were closed using Dermabond. The patient tolerated the procedure well. Estimated blood loss was 50 mL.       DO ANIRUDH Mcdonald / [place] [facility]  D: 01/03/2019 10:01     T: 01/03/2019 10:22  JOB #: [id number]

## 2019-01-03 NOTE — PERIOP NOTES
TRANSFER - OUT REPORT: 
 
Verbal report given to [address] [place] [address] Road on Armando Jackson  being transferred to [phone] for routine post - op Report consisted of patients Situation, Background, Assessment and  
Recommendations(SBAR). Information from the following report(s) SBAR, OR Summary, Procedure Summary, Intake/Output and MAR was reviewed with the receiving nurse. Lines:  
Peripheral IV 01/03/19 Right Wrist (Active) Site Assessment Clean, dry, & intact 1/3/2019  9:57 AM  
Phlebitis Assessment 0 1/3/2019  9:57 AM  
Infiltration Assessment 0 1/3/2019  9:57 AM  
Dressing Status Clean, dry, & intact; Occlusive 1/3/2019  9:57 AM  
Dressing Type Transparent;Tape 1/3/2019  9:57 AM  
Hub Color/Line Status Green; Infusing 1/3/2019  9:57 AM  
Alcohol Cap Used No 1/3/2019  9:57 AM  
   
Peripheral IV 01/03/19 Left Forearm (Active) Site Assessment Clean, dry, & intact 1/3/2019  9:57 AM  
Phlebitis Assessment 0 1/3/2019  9:57 AM  
Infiltration Assessment 0 1/3/2019  9:57 AM  
Dressing Status Clean, dry, & intact; Occlusive 1/3/2019  9:57 AM  
Dressing Type Transparent;Tape 1/3/2019  9:57 AM  
Hub Color/Line Status Green;Capped 1/3/2019  9:57 AM  
Alcohol Cap Used No 1/3/2019  9:57 AM  
  
 
Opportunity for questions and clarification was provided. Patient transported with: 
 O2 @ 2 liters VTE prophylaxis orders have been written for Armando Jackson. Patient and family given floor number and nurses name. Family updated re: pt status after security code verified.

## 2019-01-04 VITALS
TEMPERATURE: 98.6 F | HEIGHT: 71 IN | DIASTOLIC BLOOD PRESSURE: 69 MMHG | WEIGHT: 191.2 LBS | BODY MASS INDEX: 26.77 KG/M2 | HEART RATE: 75 BPM | RESPIRATION RATE: 18 BRPM | SYSTOLIC BLOOD PRESSURE: 117 MMHG | OXYGEN SATURATION: 92 %

## 2019-01-04 PROBLEM — Z90.79 S/P PROSTATECTOMY: Status: ACTIVE | Noted: 2019-01-04

## 2019-01-04 LAB
ANION GAP SERPL CALC-SCNC: 7 MMOL/L (ref 7–16)
BUN SERPL-MCNC: 11 MG/DL (ref 8–23)
CALCIUM SERPL-MCNC: 7.9 MG/DL (ref 8.3–10.4)
CHLORIDE SERPL-SCNC: 105 MMOL/L (ref 98–107)
CO2 SERPL-SCNC: 26 MMOL/L (ref 21–32)
CREAT SERPL-MCNC: 1.16 MG/DL (ref 0.8–1.5)
GLUCOSE SERPL-MCNC: 129 MG/DL (ref 65–100)
HCT VFR BLD AUTO: 41.7 % (ref 41.1–50.3)
HGB BLD-MCNC: 13.6 G/DL (ref 13.6–17.2)
POTASSIUM SERPL-SCNC: 3.8 MMOL/L (ref 3.5–5.1)
SODIUM SERPL-SCNC: 138 MMOL/L (ref 136–145)

## 2019-01-04 PROCEDURE — 36415 COLL VENOUS BLD VENIPUNCTURE: CPT

## 2019-01-04 PROCEDURE — 74011000258 HC RX REV CODE- 258: Performed by: NURSE PRACTITIONER

## 2019-01-04 PROCEDURE — 77030027138 HC INCENT SPIROMETER -A

## 2019-01-04 PROCEDURE — 85018 HEMOGLOBIN: CPT

## 2019-01-04 PROCEDURE — 74011250636 HC RX REV CODE- 250/636: Performed by: UROLOGY

## 2019-01-04 PROCEDURE — 80048 BASIC METABOLIC PNL TOTAL CA: CPT

## 2019-01-04 PROCEDURE — 74011250637 HC RX REV CODE- 250/637: Performed by: UROLOGY

## 2019-01-04 PROCEDURE — 77030010545

## 2019-01-04 PROCEDURE — 74011000258 HC RX REV CODE- 258: Performed by: UROLOGY

## 2019-01-04 RX ORDER — DOCUSATE SODIUM 100 MG/1
100 CAPSULE, LIQUID FILLED ORAL 2 TIMES DAILY
Qty: 60 CAP | Refills: 2 | Status: SHIPPED | OUTPATIENT
Start: 2019-01-04 | End: 2019-04-04

## 2019-01-04 RX ORDER — SULFAMETHOXAZOLE AND TRIMETHOPRIM 800; 160 MG/1; MG/1
1 TABLET ORAL 2 TIMES DAILY
Qty: 14 TAB | Refills: 0 | Status: SHIPPED | OUTPATIENT
Start: 2019-01-04 | End: 2019-02-12

## 2019-01-04 RX ORDER — DEXTROSE MONOHYDRATE AND SODIUM CHLORIDE 5; .45 G/100ML; G/100ML
75 INJECTION, SOLUTION INTRAVENOUS CONTINUOUS
Status: DISCONTINUED | OUTPATIENT
Start: 2019-01-04 | End: 2019-01-04 | Stop reason: HOSPADM

## 2019-01-04 RX ORDER — HYDROCODONE BITARTRATE AND ACETAMINOPHEN 7.5; 325 MG/1; MG/1
1 TABLET ORAL
Qty: 20 TAB | Refills: 0 | Status: SHIPPED
Start: 2019-01-04 | End: 2019-02-12

## 2019-01-04 RX ADMIN — SODIUM CHLORIDE 1000 MG: 900 INJECTION, SOLUTION INTRAVENOUS at 09:58

## 2019-01-04 RX ADMIN — HYDROCODONE BITARTRATE AND ACETAMINOPHEN 1 TABLET: 7.5; 325 TABLET ORAL at 06:09

## 2019-01-04 RX ADMIN — DEXTROSE MONOHYDRATE AND SODIUM CHLORIDE 75 ML/HR: 5; .45 INJECTION, SOLUTION INTRAVENOUS at 09:00

## 2019-01-04 RX ADMIN — HYDROCODONE BITARTRATE AND ACETAMINOPHEN 1 TABLET: 7.5; 325 TABLET ORAL at 14:40

## 2019-01-04 RX ADMIN — DOCUSATE SODIUM 100 MG: 100 CAPSULE, LIQUID FILLED ORAL at 09:56

## 2019-01-04 RX ADMIN — DEXTROSE MONOHYDRATE AND SODIUM CHLORIDE 150 ML/HR: 5; .45 INJECTION, SOLUTION INTRAVENOUS at 04:15

## 2019-01-04 RX ADMIN — ONDANSETRON 4 MG: 2 INJECTION INTRAMUSCULAR; INTRAVENOUS at 15:00

## 2019-01-04 NOTE — PROGRESS NOTES
Mr. Fredy Coelho has continued to ambulate and reports having a watery BM. He has not passed flatus however and does report some abdominal cramping/discomfort that is new compared to this morning. He looks well but would like to stay until the abdominal cramping subsides and he feels better. Discharge orders are in and if he feels well in a few hours, he will d/c home. If he still is cramping, he will stay overnight and d/c will be cancelled.

## 2019-01-04 NOTE — PROGRESS NOTES
GEORGE drain removed. Gauze and tegaderm applied to area. Dressing clean, dry, and intact. Patient tolerated well. Will continue to monitor patient at this time.

## 2019-01-04 NOTE — DISCHARGE INSTRUCTIONS
DISCHARGE SUMMARY from Nurse    PATIENT INSTRUCTIONS:    After general anesthesia or intravenous sedation, for 24 hours or while taking prescription Narcotics:  · Limit your activities  · Do not drive and operate hazardous machinery  · Do not make important personal or business decisions  · Do  not drink alcoholic beverages  · If you have not urinated within 8 hours after discharge, please contact your surgeon on call. Report the following to your surgeon:  · Excessive pain, swelling, redness or odor of or around the surgical area  · Temperature over 100.5  · Nausea and vomiting lasting longer than 4 hours or if unable to take medications  · Any signs of decreased circulation or nerve impairment to extremity: change in color, persistent  numbness, tingling, coldness or increase pain  · Any questions    What to do at Home:  Recommended activity: Diet: Clear liquids, advance as tolerated when passing gas. Activity: No lifting >10 lbs for four weeks  No driving while catheter is still in and while you are on pain medication. Avoid  baths, pools or hot tubs for 1 month; however, you may shower on post-op day #2  Stay hydrated and keep walking once you are home. Continue stool softeners for two weeks. Continue catheter care as instructed by nursing    If you experience any of the following symptoms catheter not draining, fever greater than 101, increased blood in urine, wound drainage, or pain, please follow up with Dr. Monie Manzanares. *  Please give a list of your current medications to your Primary Care Provider. *  Please update this list whenever your medications are discontinued, doses are      changed, or new medications (including over-the-counter products) are added. *  Please carry medication information at all times in case of emergency situations.     These are general instructions for a healthy lifestyle:    No smoking/ No tobacco products/ Avoid exposure to second hand smoke  Surgeon Neeraj Espinosa Warning:  Quitting smoking now greatly reduces serious risk to your health. Obesity, smoking, and sedentary lifestyle greatly increases your risk for illness    A healthy diet, regular physical exercise & weight monitoring are important for maintaining a healthy lifestyle    You may be retaining fluid if you have a history of heart failure or if you experience any of the following symptoms:  Weight gain of 3 pounds or more overnight or 5 pounds in a week, increased swelling in our hands or feet or shortness of breath while lying flat in bed. Please call your doctor as soon as you notice any of these symptoms; do not wait until your next office visit. Recognize signs and symptoms of STROKE:    F-face looks uneven    A-arms unable to move or move unevenly    S-speech slurred or non-existent    T-time-call 911 as soon as signs and symptoms begin-DO NOT go       Back to bed or wait to see if you get better-TIME IS BRAIN. Warning Signs of HEART ATTACK     Call 911 if you have these symptoms:   Chest discomfort. Most heart attacks involve discomfort in the center of the chest that lasts more than a few minutes, or that goes away and comes back. It can feel like uncomfortable pressure, squeezing, fullness, or pain.  Discomfort in other areas of the upper body. Symptoms can include pain or discomfort in one or both arms, the back, neck, jaw, or stomach.  Shortness of breath with or without chest discomfort.  Other signs may include breaking out in a cold sweat, nausea, or lightheadedness. Don't wait more than five minutes to call 911 - MINUTES MATTER! Fast action can save your life. Calling 911 is almost always the fastest way to get lifesaving treatment. Emergency Medical Services staff can begin treatment when they arrive -- up to an hour sooner than if someone gets to the hospital by car. The discharge information has been reviewed with the patient. The patient verbalized understanding.   Discharge medications reviewed with the patient and appropriate educational materials and side effects teaching were provided.   ___________________________________________________________________________________________________________________________________

## 2019-01-04 NOTE — PROGRESS NOTES
Admit Date: 1/3/2019 Subjective:  
 
Jose Fowler is ambulating in the room. He has ambulated several times. He has not passed flatus but feeling well and tolerating clear liquids. [place] catheter draining otto urine. GEORGE with 20 ml OP last shift. Objective:  
 
Patient Vitals for the past 8 hrs: 
 BP Temp Pulse Resp SpO2  
01/04/19 0644 118/70 98.3 °F (36.8 °C) 74 18 90 % 01/04/19 0355 106/65 98.5 °F (36.9 °C) 72 18 91 % No intake/output data recorded. 01/02 1901 - 01/04 0700 In: 4464 [P.O.:60; I.V.:3191] Out: 1300 [Urine:1200; Drains:50] Physical Exam: 
GENERAL: alert, cooperative, no distress LUNG: clear to auscultation bilaterally HEART: regular rate and rhythm, S1,S2 ABDOMEN: soft, non-tender. Active BS. No flatus. GEORGE draining serosanguinous OP. NEUROLOGIC: AOx3 Data Review Recent Results (from the past 24 hour(s)) HGB & HCT Collection Time: 01/03/19  1:51 PM  
Result Value Ref Range HGB 15.2 13.6 - 17.2 g/dL HCT 45.6 41.1 - 50.3 % METABOLIC PANEL, BASIC Collection Time: 01/04/19  5:20 AM  
Result Value Ref Range Sodium 138 136 - 145 mmol/L Potassium 3.8 3.5 - 5.1 mmol/L Chloride 105 98 - 107 mmol/L  
 CO2 26 21 - 32 mmol/L Anion gap 7 7 - 16 mmol/L Glucose 129 (H) 65 - 100 mg/dL BUN 11 8 - 23 MG/DL Creatinine 1.16 0.8 - 1.5 MG/DL  
 GFR est AA >60 >60 ml/min/1.73m2 GFR est non-AA >60 >60 ml/min/1.73m2 Calcium 7.9 (L) 8.3 - 10.4 MG/DL  
HGB & HCT Collection Time: 01/04/19  5:20 AM  
Result Value Ref Range HGB 13.6 13.6 - 17.2 g/dL HCT 41.7 41.1 - 50.3 % Assessment:  
 
Active Problems: 
  Prostate cancer ([address] (1/3/2019) POD 1: 
 
POSTOPERATIVE DIAGNOSIS:  Prostate cancer. 
  
PROCEDURE PERFORMED:  Robotic-assisted laparoscopic radical prostatectomy. Hgb 13.6 Cn 1.16 Plan:  
 
Remove GEORGE drain. 
  
Continue spain catheter care teaching.   Do not manipulate spain catheter. 
  
Continue to ambulate. 
  
 Continue clear liquids, advance diet once he is passing flatus. 
  
Encourage incentive spirometer use. 
  
Decrease IVF to 75 ml/hr. 
  
Home later today if he continues to progress Evin Anaya NP [facility] I have reviewed the above note and examined the patient. I agree with the exam, assessment and plan. Await flatus to advance diet. Remove drain. Teach Cooper care. Home later if progressing.  
 
Ros Pillai, DO

## 2019-01-04 NOTE — PROGRESS NOTES
Pt instructed on leg bag/bedside bag, verbalized understanding, pt states he feels ready for discharge.

## 2019-01-04 NOTE — PROGRESS NOTES
Shift Summary: 
 
Patient rested off and on overnight. The patient is alert and oriented x3. The patient has active bowel sounds and is voiding well through [place]. GEORGE drain output last night was 20 ml. Sanguinous in color. VSS. Patient up ad waylon. Pain controlled with norco x2. No needs stated at this time. Patient resting peacefully in bed. Bed in low position. All personal items within reach. Will continue to monitor and give bedside shift report to oncoming day shift nurse.

## 2019-01-04 NOTE — PROGRESS NOTES
Discharge instructions and prescriptions given and reviewed with pt and wife, verbalizes understanding, pt is now c/o nausea, adm zofran 4 mg IV, explained to pt he should not leave until nausea subsides, will notify primary RN.

## 2019-01-24 ENCOUNTER — HOSPITAL ENCOUNTER (OUTPATIENT)
Dept: PHYSICAL THERAPY | Age: 69
Discharge: HOME | End: 2019-01-24
Attending: UROLOGY
Payer: MEDICARE

## 2019-01-24 PROCEDURE — 97110 THERAPEUTIC EXERCISES: CPT

## 2019-01-24 NOTE — PROGRESS NOTES
Roberto Randle  : 1950  [place] / [place] / Product Type: Medicare /  [address]  at [facility] [address]  [phone]   [phone]          OUTPATIENT PHYSICAL THERAPY:Daily Note 2019   ICD-10: Treatment Diagnosis: Lack of coordination (R27.8); generalized weakness (M62.81); cancer of the prostate (C61); Pelvic Pain (R10.2)  Precautions/Allergies:   Patient has no known allergies. MD Orders: Evaluate and treat MEDICAL/REFERRING DIAGNOSIS:  Lack of coordination [R27.9]  Pelvic pain [R10.2]  Prostate CA ([address] Bernabe Ganser   DATE OF ONSET: Rising PSA, Pelvic Pain since 2018  REFERRING PHYSICIAN: Murphy Pena DO  RETURN PHYSICIAN APPOINTMENT: Surgery on 1/3/2019     INITIAL ASSESSMENT:  Mr. Justyna Lujan presents with decreased coordination, weakness, and decreased endurance of pelvic floor muscles pre-operatively prostatectomy. Today in the clinic he was education on bladder health, Kegel exercises with biofeedback, precautions with catheter, pelvic floor anatomy. He was only able to contract his pelvic floor muscles for 1-2 seconds (due to excessive PMF tension) today in the clinic. He was given PFM coordination exercises to do at home prior to surgery and once the catheter is removed. He was educated on pain after surgery and precautions with Kegel exercises. He was able to demonstrate improved coordination by the end of session today. Pt would benefit from continued therapy to address the above issues. PROBLEM LIST (Impacting functional limitations):  1. Decreased Strength  2. Decreased ADL/Functional Activities  3. Increased Pain  4. Decreased Flexibility/Joint Mobility  5. Decreased Knowledge of Precautions  6. Decreased [place] with Home Exercise Program INTERVENTIONS PLANNED: (Treatment may consist of any combination of the following)  1. Heat  2. Manual Therapy  3.  Therapeutic Exercise/Strengthening   TREATMENT PLAN:  Effective Dates: 12/13/2018 TO 2/11/2019 (60 days). Frequency/Duration: 1 x pre-operatively, then 3 weeks after surgery (weekly) throughout POC  GOALS: (Goals have been discussed and agreed upon with patient.)  Short-Term Functional Goals: Time Frame: TODAY  1. Patient will verbalize rationale and purposes for exercises. (MET 12/13/2018)    Discharge Goals: Time Frame: 60 days  1. Pt with demonstrate normal voluntary relaxation of the pelvic floor muscle group to improve pelvic floor ROM and tolerate pain free sitting x 1 hour. 2. Pt will demonstrate 10 quick flicks of the pelvic floor muscle group, without compensation, to implement urge suppression appropriately with urgency of urination and decrease the number of pads used per day. 3. Pt will increase water intake by 16 oz and decrease irritant consumption by 8 oz to improve bladder health and decrease UI. Rehabilitation Potential For Stated Goals: Good               The information in this section was collected on 12/13/2018 (except where otherwise noted). HISTORY:   History of Present Injury/Illness (Reason for Referral):  Urinary: Currently, patient denies UI, dysuria, or voiding dysfunction. Mild hesitancy with first void upon rising. Bowel: Due to anal fissure, sphincterotomy (5/2017), and hemorrhoid surgery, pt  Refuses to get constipated. Stool softeners used PRN. Generally reports having a painfree BM at least 1 x daily. Sexual: Not really sexually active at this time, but not long ago, used Viagra PRN with good success. Pelvic Organ Prolapse/Pelvic Pain: In 2/2017, pt began to experience deep anorectal pain. No improvements with multiple surgeries then diagnosed with levator ani spasms. Pain improved with daily Flexeril. Has been pain free for 60 days, but very fearful that PFM retraining post-prostatectomy will bring back pain. Pain was severe, constant and limited sitting in any position.      Post Prostectomy 1/24/2019: Pt feels like he is doing \"very well considering what happened to me\". When catheter was first removed patient reported no UI. He did however, start to notice intermittent penile burning and perineal pain (aching, intermittent, generally < 5/10). He felt this was possibly from fear of leakage and anxiety, so he has begun to wear pads (generally 2 PPD and 1 nightly) to allow himself to relax his pelvic muscles. UI is rare, generally occurring with passing gas. Urinary frequency is every 2 hours during the day and 1-2 x nightly. Fluid: Coffee in am and generally water the rest of the day. In regards to bowel health, it did take ~ 1 week to return to , but now he is going every day without pushing or straining. Past Medical History/Comorbidities:   Mr. Isabel Bray  has a past medical history of Cancer ([facility] [address] Elevated PSA, Hyperlipidemia, Hypertension, Pyloric stenosis (1950), and Squamous carcinoma. Mr. Isabel Bray  has a past surgical history that includes hx hemorrhoidectomy; hx colonoscopy; hx tonsillectomy; hx vasectomy (1985); hx hemorrhoidectomy (1975, 1985); hx other surgical; and hx other surgical (1985). Social History/Living Environment:     Lives with wife  Prior Level of Function/Work/Activity:  Retired. Ambulatory/Rehab Services H2 Model Falls Risk Assessment    Risk Factors:       (1)  Gender [Male] Ability to Rise from Chair:       (0)  Ability to rise in a single movement    Falls Prevention Plan:       No modifications necessary   Total: (5 or greater = High Risk): 1    ©2010 [facility] of [employer]. All Rights Reserved. [facility] Patent #[id number]. Federal Law prohibits the replication, distribution or use without written permission from [facility] [employer]     Current Medications:       Current Outpatient Medications:     HYDROcodone-acetaminophen (NORCO) 7.5-325 mg per tablet, Take 1 Tab by mouth every four (4) hours as needed.  Max Daily Amount: 6 Tabs., Disp: 20 Tab, Rfl: 0    trimethoprim-sulfamethoxazole (BACTRIM DS) 160-800 mg per tablet, Take 1 Tab by mouth two (2) times a day., Disp: 14 Tab, Rfl: 0    docusate sodium (COLACE) 100 mg capsule, Take 1 Cap by mouth two (2) times a day for 90 days. , Disp: 60 Cap, Rfl: 2    metoprolol succinate (TOPROL XL) 50 mg XL tablet, Take 1.5 Tabs by mouth daily. (Patient taking differently: Take 75 mg by mouth daily. morning), Disp: 135 Tab, Rfl: 0    pravastatin (PRAVACHOL) 80 mg tablet, Take 1 Tab by mouth nightly., Disp: 90 Tab, Rfl: 1    cyclobenzaprine (FLEXERIL) 10 mg tablet, TK ONE T PO Q 8 H PRF MUSCLE SPASMS-taking one daily, Disp: , Rfl: 2    cholecalciferol, VITAMIN D3, (VITAMIN D3) 5,000 unit tab tablet, Take 5,000 Units by mouth daily. Indications: PREVENTION OF VITAMIN D DEFICIENCY, Disp: , Rfl:    Date Last Reviewed:  1/24/2019     Number of Personal Factors/Comorbidities that affect the Plan of Care: 0: LOW COMPLEXITY   EXAMINATION:   Strength:          PFM via SEMG (external): Standing resting tone 3-5 mV. Seated 2-3 mV. Able to perform anterior and posterior isolated small PFM contraction with mild delay in relaxation. Due to history of pelvic pain no holds performed. Diaphragmatic breathing impaired with PFM contraction and chest breathing noted. Body Structures Involved:  1. Muscles Body Functions Affected:  1. Neuromusculoskeletal  2. Movement Related Activities and Participation Affected:  1. General Tasks and Demands  2. Mobility  3. Self Care   Number of elements (examined above) that affect the Plan of Care: 3: MODERATE COMPLEXITY   CLINICAL PRESENTATION:   Presentation: Stable and uncomplicated: LOW COMPLEXITY   CLINICAL DECISION MAKING:   Outcome Measure:    Tool Used: NIH - Chronic Prostatitis Symptom Index (NIH - CPSI for Males)   Score:  Initial:   Pain- 2  Urine- 1  QOL- 0 Most Recent: X (Date: -- )     Medical Necessity:   · Patient is expected to demonstrate progress in strength, coordination and functional technique to eliminate pelvic pain and UI post-prostatectomy. Reason for Services/Other Comments:  · Patient continues to require skilled intervention due to above mentioned deficits. Use of outcome tool(s) and clinical judgement create a POC that gives a: Clear prediction of patient's progress: LOW COMPLEXITY            TREATMENT:   (In addition to Assessment/Re-Assessment sessions the following treatments were rendered)    Pre-treatment Symptoms/Complaints:  Pt returning 3 weeks post prostatectomy. See history of illness for complaints. Pain: Initial:   Pain Intensity 1: 1 /10 Post Session:  0/10     THERAPEUTIC EXERCISE: (55 minutes):  Exercises per grid below to improve mobility, strength and coordination. Required moderate visual and verbal cues to promomte proper performance. Progressed resistance, repetitions and complexity of movement as indicated. Date:  1/24/2019     Activity/Exercise Parameters   Pelvic Floor Coordination With SEMG; anterior/posterior tilt, PFM Drops, emphasis on full relaxation (standing, supine, and seated)   Child's Pose With Drops and DBing; 2 x 60 seconds   Trunk Rotation X 10 B; With Drops and DBing   Cat/Camel X 10 each direction; With Drops and DBing   Patient Education Post surgical precautions, down training, mobility, role of core   HEP Pre op: Diaphragmatic breathing, Child's pose, Happy Baby  Post op: 10 QF; 3 x daily     Treatment/Session Assessment:    · Response to Treatment:  Pt with slightly elevated resting tone at start of session, ~ 3-5 mV. With instruction and belly breathing able to perform drop correctly and improve resting tone in all positions. No pain when leaving and good understanding of drops in various positions. · Compliance with Program/Exercises: Compliant all of the time. · Recommendations/Intent for next treatment session: \"Next visit will focus on advancements to more challenging activities\".  SEMG, Hip Strength with Mobility following  Total Treatment Duration: 55 minutes  PT Patient Time In/Time Out  Time In: 0930  Time Out: [address], PT    Future Appointments   Date Time Provider Kevin Marge   2/12/2019  9:15 AM Catrina Cade MD [facility] PRE PRE   4/8/2019  9:00 AM [id number] BLOOD DRAW [facility] PGU52 PGU   4/17/2019  9:50 AM [place], Donavon Callander, DO [facility] PGU52 PGU             .

## 2019-01-28 ENCOUNTER — HOSPITAL ENCOUNTER (OUTPATIENT)
Dept: PHYSICAL THERAPY | Age: 69
Discharge: HOME | End: 2019-01-28
Attending: UROLOGY
Payer: MEDICARE

## 2019-01-28 PROCEDURE — 97110 THERAPEUTIC EXERCISES: CPT

## 2019-01-28 NOTE — PROGRESS NOTES
Kwaku Leonarders  : 1950  [place] / [place] / Product Type: Medicare /  [address]  at [facility] [address]  [phone]   [phone]          OUTPATIENT PHYSICAL THERAPY:Daily Note 2019   ICD-10: Treatment Diagnosis: Lack of coordination (R27.8); generalized weakness (M62.81); cancer of the prostate (C61); Pelvic Pain (R10.2)  Precautions/Allergies:   Patient has no known allergies. MD Orders: Evaluate and treat MEDICAL/REFERRING DIAGNOSIS:  Lack of coordination [R27.9]  Pelvic pain [R10.2]  Prostate CA ([facility] [address] Sharon Martinez   DATE OF ONSET: Rising PSA, Pelvic Pain since 2018  REFERRING PHYSICIAN: Cyrus Griffin,   RETURN PHYSICIAN APPOINTMENT: Surgery on 1/3/2019     INITIAL ASSESSMENT:  Mr. Josi Cronin presents with decreased coordination, weakness, and decreased endurance of pelvic floor muscles pre-operatively prostatectomy. Today in the clinic he was education on bladder health, Kegel exercises with biofeedback, precautions with catheter, pelvic floor anatomy. He was only able to contract his pelvic floor muscles for 1-2 seconds (due to excessive PMF tension) today in the clinic. He was given PFM coordination exercises to do at home prior to surgery and once the catheter is removed. He was educated on pain after surgery and precautions with Kegel exercises. He was able to demonstrate improved coordination by the end of session today. Pt would benefit from continued therapy to address the above issues. PROBLEM LIST (Impacting functional limitations):  1. Decreased Strength  2. Decreased ADL/Functional Activities  3. Increased Pain  4. Decreased Flexibility/Joint Mobility  5. Decreased Knowledge of Precautions  6. Decreased [place] with Home Exercise Program INTERVENTIONS PLANNED: (Treatment may consist of any combination of the following)  1. Heat  2. Manual Therapy  3.  Therapeutic Exercise/Strengthening   TREATMENT PLAN:  Effective Dates: 12/13/2018 TO 2/11/2019 (60 days). Frequency/Duration: 1 x pre-operatively, then 3 weeks after surgery (weekly) throughout POC  GOALS: (Goals have been discussed and agreed upon with patient.)  Short-Term Functional Goals: Time Frame: TODAY  1. Patient will verbalize rationale and purposes for exercises. (MET 12/13/2018)    Discharge Goals: Time Frame: 60 days  1. Pt with demonstrate normal voluntary relaxation of the pelvic floor muscle group to improve pelvic floor ROM and tolerate pain free sitting x 1 hour. (MET 1/28/2019)  2. Pt will demonstrate 10 quick flicks of the pelvic floor muscle group, without compensation, to implement urge suppression appropriately with urgency of urination and decrease the number of pads used per day. 3. Pt will increase water intake by 16 oz and decrease irritant consumption by 8 oz to improve bladder health and decrease UI. Rehabilitation Potential For Stated Goals: Good               The information in this section was collected on 12/13/2018 (except where otherwise noted). HISTORY:   History of Present Injury/Illness (Reason for Referral):  Urinary: Currently, patient denies UI, dysuria, or voiding dysfunction. Mild hesitancy with first void upon rising. Bowel: Due to anal fissure, sphincterotomy (5/2017), and hemorrhoid surgery, pt  Refuses to get constipated. Stool softeners used PRN. Generally reports having a painfree BM at least 1 x daily. Sexual: Not really sexually active at this time, but not long ago, used Viagra PRN with good success. Pelvic Organ Prolapse/Pelvic Pain: In 2/2017, pt began to experience deep anorectal pain. No improvements with multiple surgeries then diagnosed with levator ani spasms. Pain improved with daily Flexeril. Has been pain free for 60 days, but very fearful that PFM retraining post-prostatectomy will bring back pain.  Pain was severe, constant and limited sitting in any position. Post Prostectomy 1/24/2019: Pt feels like he is doing \"very well considering what happened to me\". When catheter was first removed patient reported no UI. He did however, start to notice intermittent penile burning and perineal pain (aching, intermittent, generally < 5/10). He felt this was possibly from fear of leakage and anxiety, so he has begun to wear pads (generally 2 PPD and 1 nightly) to allow himself to relax his pelvic muscles. UI is rare, generally occurring with passing gas. Urinary frequency is every 2 hours during the day and 1-2 x nightly. Fluid: Coffee in am and generally water the rest of the day. In regards to bowel health, it did take ~ 1 week to return to , but now he is going every day without pushing or straining. Past Medical History/Comorbidities:   Mr. Amber Perales  has a past medical history of Cancer ([facility] [address] Elevated PSA, Hyperlipidemia, Hypertension, Pyloric stenosis (1950), and Squamous carcinoma. Mr. Amber Perales  has a past surgical history that includes hx hemorrhoidectomy; hx colonoscopy; hx tonsillectomy; hx vasectomy (1985); hx hemorrhoidectomy (1975, 1985); hx other surgical; and hx other surgical (1985). Social History/Living Environment:     Lives with wife  Prior Level of Function/Work/Activity:  Retired. Ambulatory/Rehab Services H2 Model Falls Risk Assessment    Risk Factors:       (1)  Gender [Male] Ability to Rise from Chair:       (0)  Ability to rise in a single movement    Falls Prevention Plan:       No modifications necessary   Total: (5 or greater = High Risk): 1    ©2010 [facility] [employer]. All Rights Reserved. [facility] Patent #[id number]. Federal Law prohibits the replication, distribution or use without written permission from [facility] [employer]     Current Medications:       Current Outpatient Medications:     HYDROcodone-acetaminophen (NORCO) 7.5-325 mg per tablet, Take 1 Tab by mouth every four (4) hours as needed.  Max Daily Amount: 6 Tabs., Disp: 20 Tab, Rfl: 0    trimethoprim-sulfamethoxazole (BACTRIM DS) 160-800 mg per tablet, Take 1 Tab by mouth two (2) times a day., Disp: 14 Tab, Rfl: 0    docusate sodium (COLACE) 100 mg capsule, Take 1 Cap by mouth two (2) times a day for 90 days. , Disp: 60 Cap, Rfl: 2    metoprolol succinate (TOPROL XL) 50 mg XL tablet, Take 1.5 Tabs by mouth daily. (Patient taking differently: Take 75 mg by mouth daily. morning), Disp: 135 Tab, Rfl: 0    pravastatin (PRAVACHOL) 80 mg tablet, Take 1 Tab by mouth nightly., Disp: 90 Tab, Rfl: 1    cyclobenzaprine (FLEXERIL) 10 mg tablet, TK ONE T PO Q 8 H PRF MUSCLE SPASMS-taking one daily, Disp: , Rfl: 2    cholecalciferol, VITAMIN D3, (VITAMIN D3) 5,000 unit tab tablet, Take 5,000 Units by mouth daily. Indications: PREVENTION OF VITAMIN D DEFICIENCY, Disp: , Rfl:    Date Last Reviewed:  1/28/2019     Number of Personal Factors/Comorbidities that affect the Plan of Care: 0: LOW COMPLEXITY   EXAMINATION:   Strength:          PFM via SEMG (external): Standing resting tone 3-5 mV. Seated 2-3 mV. Able to perform anterior and posterior isolated small PFM contraction with mild delay in relaxation. Due to history of pelvic pain no holds performed. Diaphragmatic breathing impaired with PFM contraction and chest breathing noted. Body Structures Involved:  1. Muscles Body Functions Affected:  1. Neuromusculoskeletal  2. Movement Related Activities and Participation Affected:  1. General Tasks and Demands  2. Mobility  3. Self Care   Number of elements (examined above) that affect the Plan of Care: 3: MODERATE COMPLEXITY   CLINICAL PRESENTATION:   Presentation: Stable and uncomplicated: LOW COMPLEXITY   CLINICAL DECISION MAKING:   Outcome Measure:    Tool Used: NIH - Chronic Prostatitis Symptom Index (NIH - CPSI for Males)   Score:  Initial:   Pain- 2  Urine- 1  QOL- 0 Most Recent: X (Date: -- )       Medical Necessity:   · Patient is expected to demonstrate progress in strength, coordination and functional technique to eliminate pelvic pain and UI post-prostatectomy. Reason for Services/Other Comments:  · Patient continues to require skilled intervention due to above mentioned deficits. Use of outcome tool(s) and clinical judgement create a POC that gives a: Clear prediction of patient's progress: LOW COMPLEXITY            TREATMENT:   (In addition to Assessment/Re-Assessment sessions the following treatments were rendered)    Pre-treatment Symptoms/Complaints:  Pelvic pain has gotten better, but still noticing some pubic pain occasional. Returned to Flexeril and that seems to be helping. UI is just a \"nuisance\" and really with drops and passing gas    Pain: Initial:   Pain Intensity 1: 0 /10 Post Session:  0/10     THERAPEUTIC EXERCISE: (55 minutes):  Exercises per grid below to improve mobility, strength and coordination. Required moderate visual and verbal cues to promomte proper performance. Progressed resistance, repetitions and complexity of movement as indicated. Date:  1/28/2019     Activity/Exercise Parameters   Pelvic Floor Coordination Supine and seated; 1 H 10 R   Child's Pose With Drops and DBing; 2 x 60 seconds   Trunk Rotation X 10 B; With Drops and DBing   Cat/Camel X 10 each direction; With Drops and DBing   SLR 15 B   Bridge 2 x 15   Clamshells 15 B   Reverse CLamshells 15 B   Hip Abduction 15 B   Patient Education ED Management   HEP Pre op: Diaphragmatic breathing, Child's pose, Happy Baby  Post op: 10 QF; 3 x daily     Treatment/Session Assessment:    · Response to Treatment:  Emphasis on education, as well as basic hip stab and PF drops. No pain with any exercises and confident with basic stab program. Verbena Meiers only occurring with drops and passing gas. Progressing well and achieved Discharge goal 1 at this time. · Compliance with Program/Exercises: Compliant all of the time. · Recommendations/Intent for next treatment session:  \"Next visit will focus on advancements to more challenging activities\". SEMG, Hip Strength with Mobility following  Total Treatment Duration: 55 minutes  PT Patient Time In/Time Out  Time In: 1330  [facility]: [facility]    Future Appointments   Date Time Provider Kevin Marge   2/12/2019  9:15 AM Sachi Rios MD [facility] PRE PRE   4/8/2019  9:00 AM [id number] BLOOD DRAW [facility] [id number] PGU   4/17/2019  9:50 AM Sterrett, Nancie Claude, DO [facility] [id number] PGU             .

## 2019-01-30 ENCOUNTER — APPOINTMENT (RX ONLY)
Dept: URBAN - METROPOLITAN AREA CLINIC 349 | Facility: CLINIC | Age: 69
Setting detail: DERMATOLOGY
End: 2019-01-30

## 2019-01-30 DIAGNOSIS — D18.0 HEMANGIOMA: ICD-10-CM

## 2019-01-30 DIAGNOSIS — L91.8 OTHER HYPERTROPHIC DISORDERS OF THE SKIN: ICD-10-CM

## 2019-01-30 DIAGNOSIS — D22 MELANOCYTIC NEVI: ICD-10-CM

## 2019-01-30 DIAGNOSIS — L30.9 DERMATITIS, UNSPECIFIED: ICD-10-CM

## 2019-01-30 DIAGNOSIS — L81.4 OTHER MELANIN HYPERPIGMENTATION: ICD-10-CM

## 2019-01-30 PROBLEM — D22.72 MELANOCYTIC NEVI OF LEFT LOWER LIMB, INCLUDING HIP: Status: ACTIVE | Noted: 2019-01-30

## 2019-01-30 PROBLEM — I10 ESSENTIAL (PRIMARY) HYPERTENSION: Status: ACTIVE | Noted: 2019-01-30

## 2019-01-30 PROBLEM — L23.7 ALLERGIC CONTACT DERMATITIS DUE TO PLANTS, EXCEPT FOOD: Status: ACTIVE | Noted: 2019-01-30

## 2019-01-30 PROBLEM — D18.01 HEMANGIOMA OF SKIN AND SUBCUTANEOUS TISSUE: Status: ACTIVE | Noted: 2019-01-30

## 2019-01-30 PROBLEM — L85.3 XEROSIS CUTIS: Status: ACTIVE | Noted: 2019-01-30

## 2019-01-30 PROBLEM — D22.5 MELANOCYTIC NEVI OF TRUNK: Status: ACTIVE | Noted: 2019-01-30

## 2019-01-30 PROBLEM — D04.72 CARCINOMA IN SITU OF SKIN OF LEFT LOWER LIMB, INCLUDING HIP: Status: ACTIVE | Noted: 2019-01-30

## 2019-01-30 PROCEDURE — 11301 SHAVE SKIN LESION 0.6-1.0 CM: CPT | Mod: 59

## 2019-01-30 PROCEDURE — 17110 DESTRUCTION B9 LES UP TO 14: CPT

## 2019-01-30 PROCEDURE — 11306 SHAVE SKIN LESION 0.6-1.0 CM: CPT | Mod: 59

## 2019-01-30 PROCEDURE — ? PATHOLOGY BILLING

## 2019-01-30 PROCEDURE — ? BENIGN DESTRUCTION

## 2019-01-30 PROCEDURE — ? SHAVE REMOVAL

## 2019-01-30 PROCEDURE — 88305 TISSUE EXAM BY PATHOLOGIST: CPT

## 2019-01-30 PROCEDURE — ? COUNSELING

## 2019-01-30 PROCEDURE — 99213 OFFICE O/P EST LOW 20 MIN: CPT | Mod: 25

## 2019-01-30 PROCEDURE — ? TREATMENT REGIMEN

## 2019-01-30 PROCEDURE — A4550 SURGICAL TRAYS: HCPCS

## 2019-01-30 PROCEDURE — ? RECOMMENDATIONS

## 2019-01-30 ASSESSMENT — LOCATION SIMPLE DESCRIPTION DERM
LOCATION SIMPLE: RIGHT ANTERIOR NECK
LOCATION SIMPLE: LEFT FOOT
LOCATION SIMPLE: LEFT ANTERIOR NECK
LOCATION SIMPLE: LEFT UPPER BACK
LOCATION SIMPLE: LEFT LOWER BACK
LOCATION SIMPLE: LEFT ANKLE
LOCATION SIMPLE: LEFT FOOT

## 2019-01-30 ASSESSMENT — LOCATION ZONE DERM
LOCATION ZONE: TRUNK
LOCATION ZONE: NECK
LOCATION ZONE: FEET
LOCATION ZONE: FEET
LOCATION ZONE: LEG

## 2019-01-30 ASSESSMENT — LOCATION DETAILED DESCRIPTION DERM
LOCATION DETAILED: 4TH WEBSPACE LEFT FOOT
LOCATION DETAILED: LEFT MID-UPPER BACK
LOCATION DETAILED: LEFT DORSAL FOOT
LOCATION DETAILED: LEFT SUPERIOR UPPER BACK
LOCATION DETAILED: LEFT CLAVICULAR NECK
LOCATION DETAILED: LEFT ANKLE
LOCATION DETAILED: LEFT SUPERIOR MEDIAL MIDBACK
LOCATION DETAILED: LEFT DORSAL FOOT
LOCATION DETAILED: RIGHT CLAVICULAR NECK

## 2019-01-30 NOTE — PROCEDURE: PATHOLOGY BILLING
Immunohistochemistry ([id number] and [id number]) billing is not performed here. Please use the Immunohistochemistry Stain Billing plan to accomplish this.

## 2019-01-30 NOTE — PROCEDURE: RECOMMENDATIONS
Detail Level: Detailed
Recommendations (Free Text): Patient states he quit wearing color shirts and it helped

## 2019-01-30 NOTE — PROCEDURE: BENIGN DESTRUCTION
Consent: The patient's consent was obtained including but not limited to risks of crusting, scabbing, blistering, scarring, darker or lighter pigmentary change, recurrence, incomplete removal and infection.
Detail Level: Detailed
Post-Care Instructions: I reviewed with the patient in detail post-care instructions. Patient is to wear sunprotection, and avoid picking at any of the treated lesions. Pt may apply Vaseline to crusted or scabbing areas.
Include Z78.9 (Other Specified Conditions Influencing Health Status) As An Associated Diagnosis?: Yes
Medical Necessity Clause: This procedure was medically necessary because the lesions that were treated were:
Medical Necessity Information: It is in your best interest to select a reason for this procedure from the list below. All of these items fulfill various CMS LCD requirements except the new and changing color options.
Anesthesia Volume In Cc: 0.5
Add 52 Modifier (Optional): no

## 2019-01-30 NOTE — PROCEDURE: SHAVE REMOVAL
Anesthesia Volume In Cc: 0.5
Accession #: md spence
Consent was obtained from the patient. The risks and benefits to therapy were discussed in detail. Specifically, the risks of infection, scarring, bleeding, prolonged wound healing, incomplete removal, allergy to anesthesia, nerve injury and recurrence were addressed. Prior to the procedure, the treatment site was clearly identified and confirmed by the patient. All components of Universal Protocol/PAUSE Rule completed.
Was A Bandage Applied: Yes
Detail Level: Detailed
Medical Necessity Information: It is in your best interest to select a reason for this procedure from the list below. All of these items fulfill various CMS LCD requirements except the new and changing color options.
Bill For Surgical Tray: no
X Size Of Lesion In Cm (Optional): 0
Notification Instructions: Patient will be notified of biopsy results. However, patient instructed to call the office if not contacted within 2 weeks.
Billing Type: Third-Party Bill
Wound Care: Vaseline
Medical Necessity Clause: This procedure was medically necessary because the lesion that was treated was: irritated and two tone color
Size Of Lesion In Cm (Required): 0.6
Biopsy Method: Janes graves
Anesthesia Type: 1% lidocaine with epinephrine and a 1:10 solution of 8.4% sodium bicarbonate
Post-Care Instructions: I reviewed with the patient in detail post-care instructions. Patient is to keep the biopsy site dry overnight, and then apply bacitracin twice daily until healed. Patient may apply hydrogen peroxide soaks to remove any crusting.
Hemostasis: Aluminum Chloride
Path Notes Override (Will Replace All Of The Above Text): Specimen in 2 pieces

## 2019-01-30 NOTE — PROCEDURE: PATHOLOGY BILLING
Immunohistochemistry ([id number] and [id number]) billing is not performed here. Please use the Immunohistochemistry Stain Billing plan to accomplish this. Immunohistochemistry ([id number] and [id number]) billing is not performed here. Please use the Immunohistochemistry Stain Billing plan to accomplish this.

## 2019-02-19 NOTE — THERAPY DISCHARGE
Edgrado Villarreal  : 1950  [place] / Product Type: Medicare /  [address]  at [address] [facility], [address] [place] [address]  [phone]   [phone]          OUTPATIENT PHYSICAL THERAPY:Discontinuation Summary 2019   ICD-10: Treatment Diagnosis: Lack of coordination (R27.8); generalized weakness (M62.81); cancer of the prostate (C61); Pelvic Pain (R10.2)  Precautions/Allergies:   Patient has no known allergies. MD Orders: Evaluate and treat MEDICAL/REFERRING DIAGNOSIS:  Lack of coordination [R27.9]  Pelvic pain [R10.2]  Prostate CA ([address] Daiva Asiya   DATE OF ONSET: Rising PSA, Pelvic Pain since 2018  REFERRING PHYSICIAN: Rani Simmons DO  RETURN PHYSICIAN APPOINTMENT: Surgery on 1/3/2019     2019: Edgardo Villarreal has been seen in physical therapy from 2018 to 2019 for 3 visits. Treatment has been discontinued at this time due to no longer being in need of PT at this time. .  The below goals were met prior to discontinuation. Some goals were not met due to ending PT earlier than POC outlined. Thank you for this referral.       INITIAL ASSESSMENT:  Mr. Marian Lin presents with decreased coordination, weakness, and decreased endurance of pelvic floor muscles pre-operatively prostatectomy. Today in the clinic he was education on bladder health, Kegel exercises with biofeedback, precautions with catheter, pelvic floor anatomy. He was only able to contract his pelvic floor muscles for 1-2 seconds (due to excessive PMF tension) today in the clinic. He was given PFM coordination exercises to do at home prior to surgery and once the catheter is removed. He was educated on pain after surgery and precautions with Kegel exercises. He was able to demonstrate improved coordination by the end of session today. Pt would benefit from continued therapy to address the above issues.        PROBLEM LIST (Impacting functional limitations):  1. Decreased Strength  2. Decreased ADL/Functional Activities  3. Increased Pain  4. Decreased Flexibility/Joint Mobility  5. Decreased Knowledge of Precautions  6. Decreased [place] with Home Exercise Program INTERVENTIONS PLANNED: (Treatment may consist of any combination of the following)  1. Heat  2. Manual Therapy  3. Therapeutic Exercise/Strengthening   TREATMENT PLAN:  Effective Dates: 12/13/2018 TO 2/11/2019 (60 days). Frequency/Duration: 1 x pre-operatively, then 3 weeks after surgery (weekly) throughout POC  GOALS: (Goals have been discussed and agreed upon with patient.)  Short-Term Functional Goals: Time Frame: TODAY  1. Patient will verbalize rationale and purposes for exercises. (MET 12/13/2018)    Discharge Goals: Time Frame: 60 days  1. Pt with demonstrate normal voluntary relaxation of the pelvic floor muscle group to improve pelvic floor ROM and tolerate pain free sitting x 1 hour. (MET 1/28/2019)  2. Pt will demonstrate 10 quick flicks of the pelvic floor muscle group, without compensation, to implement urge suppression appropriately with urgency of urination and decrease the number of pads used per day. NOT MET  3. Pt will increase water intake by 16 oz and decrease irritant consumption by 8 oz to improve bladder health and decrease UI. MET 1/28/2019    Rehabilitation Potential For Stated Goals: Good               .

## 2019-03-17 ENCOUNTER — ANESTHESIA EVENT (OUTPATIENT)
Dept: SURGERY | Age: 69
End: 2019-03-17
Payer: MEDICARE

## 2019-03-18 ENCOUNTER — ANESTHESIA (OUTPATIENT)
Dept: SURGERY | Age: 69
End: 2019-03-18
Payer: MEDICARE

## 2019-03-18 ENCOUNTER — HOSPITAL ENCOUNTER (OUTPATIENT)
Age: 69
Setting detail: OUTPATIENT SURGERY
Discharge: HOME | End: 2019-03-18
Attending: UROLOGY | Admitting: UROLOGY
Payer: MEDICARE

## 2019-03-18 VITALS
SYSTOLIC BLOOD PRESSURE: 124 MMHG | WEIGHT: 177.2 LBS | HEART RATE: 63 BPM | RESPIRATION RATE: 14 BRPM | BODY MASS INDEX: 24.71 KG/M2 | OXYGEN SATURATION: 95 % | TEMPERATURE: 97.6 F | DIASTOLIC BLOOD PRESSURE: 78 MMHG

## 2019-03-18 DIAGNOSIS — N99.114 POSTPROCEDURAL MALE URETHRAL STRICTURE: Primary | ICD-10-CM

## 2019-03-18 PROCEDURE — 77030010545: Performed by: UROLOGY

## 2019-03-18 PROCEDURE — 74011636320 HC RX REV CODE- 636/320: Performed by: UROLOGY

## 2019-03-18 PROCEDURE — 77030020782 HC GWN BAIR PAWS FLX 3M -B: Performed by: NURSE ANESTHETIST, CERTIFIED REGISTERED

## 2019-03-18 PROCEDURE — 77030019927 HC TBNG IRR CYSTO BAXT -A: Performed by: UROLOGY

## 2019-03-18 PROCEDURE — 74011250636 HC RX REV CODE- 250/636: Performed by: UROLOGY

## 2019-03-18 PROCEDURE — 76210000020 HC REC RM PH II FIRST 0.5 HR: Performed by: UROLOGY

## 2019-03-18 PROCEDURE — 74011250637 HC RX REV CODE- 250/637: Performed by: ANESTHESIOLOGY

## 2019-03-18 PROCEDURE — 74011000250 HC RX REV CODE- 250

## 2019-03-18 PROCEDURE — 77030032490 HC SLV COMPR SCD KNE COVD -B: Performed by: UROLOGY

## 2019-03-18 PROCEDURE — C1758 CATHETER, URETERAL: HCPCS | Performed by: UROLOGY

## 2019-03-18 PROCEDURE — 77030010509 HC AIRWY LMA MSK TELE -A: Performed by: NURSE ANESTHETIST, CERTIFIED REGISTERED

## 2019-03-18 PROCEDURE — 76010000138 HC OR TIME 0.5 TO 1 HR: Performed by: UROLOGY

## 2019-03-18 PROCEDURE — 74011250636 HC RX REV CODE- 250/636: Performed by: ANESTHESIOLOGY

## 2019-03-18 PROCEDURE — 77030018846 HC SOL IRR STRL H20 ICUM -A: Performed by: UROLOGY

## 2019-03-18 PROCEDURE — 74011250636 HC RX REV CODE- 250/636

## 2019-03-18 PROCEDURE — 76210000006 HC OR PH I REC 0.5 TO 1 HR: Performed by: UROLOGY

## 2019-03-18 PROCEDURE — 76060000032 HC ANESTHESIA 0.5 TO 1 HR: Performed by: UROLOGY

## 2019-03-18 PROCEDURE — C1769 GUIDE WIRE: HCPCS | Performed by: UROLOGY

## 2019-03-18 PROCEDURE — 77030018832 HC SOL IRR H20 ICUM -A: Performed by: UROLOGY

## 2019-03-18 PROCEDURE — 77030005518 HC CATH URETH FOL 2W BARD -B: Performed by: UROLOGY

## 2019-03-18 RX ORDER — ONDANSETRON 2 MG/ML
INJECTION INTRAMUSCULAR; INTRAVENOUS AS NEEDED
Status: DISCONTINUED | OUTPATIENT
Start: 2019-03-18 | End: 2019-03-18 | Stop reason: HOSPADM

## 2019-03-18 RX ORDER — PROPOFOL 10 MG/ML
INJECTION, EMULSION INTRAVENOUS AS NEEDED
Status: DISCONTINUED | OUTPATIENT
Start: 2019-03-18 | End: 2019-03-18 | Stop reason: HOSPADM

## 2019-03-18 RX ORDER — ACETAMINOPHEN 500 MG
500 TABLET ORAL
COMMUNITY

## 2019-03-18 RX ORDER — HYDROCODONE BITARTRATE AND ACETAMINOPHEN 5; 325 MG/1; MG/1
1 TABLET ORAL
Qty: 15 TAB | Refills: 0 | Status: SHIPPED | OUTPATIENT
Start: 2019-03-18 | End: 2019-03-21

## 2019-03-18 RX ORDER — MIDAZOLAM HYDROCHLORIDE 1 MG/ML
2 INJECTION, SOLUTION INTRAMUSCULAR; INTRAVENOUS
Status: DISCONTINUED | OUTPATIENT
Start: 2019-03-18 | End: 2019-03-18 | Stop reason: HOSPADM

## 2019-03-18 RX ORDER — LIDOCAINE HYDROCHLORIDE 10 MG/ML
0.3 INJECTION INFILTRATION; PERINEURAL ONCE
Status: DISCONTINUED | OUTPATIENT
Start: 2019-03-18 | End: 2019-03-18 | Stop reason: HOSPADM

## 2019-03-18 RX ORDER — CIPROFLOXACIN 2 MG/ML
400 INJECTION, SOLUTION INTRAVENOUS
Status: COMPLETED | OUTPATIENT
Start: 2019-03-18 | End: 2019-03-18

## 2019-03-18 RX ORDER — ACETAMINOPHEN 500 MG
1000 TABLET ORAL ONCE
Status: COMPLETED | OUTPATIENT
Start: 2019-03-18 | End: 2019-03-18

## 2019-03-18 RX ORDER — LIDOCAINE HYDROCHLORIDE 20 MG/ML
INJECTION, SOLUTION EPIDURAL; INFILTRATION; INTRACAUDAL; PERINEURAL AS NEEDED
Status: DISCONTINUED | OUTPATIENT
Start: 2019-03-18 | End: 2019-03-18 | Stop reason: HOSPADM

## 2019-03-18 RX ORDER — FENTANYL CITRATE 50 UG/ML
INJECTION, SOLUTION INTRAMUSCULAR; INTRAVENOUS AS NEEDED
Status: DISCONTINUED | OUTPATIENT
Start: 2019-03-18 | End: 2019-03-18 | Stop reason: HOSPADM

## 2019-03-18 RX ORDER — HYDROMORPHONE HYDROCHLORIDE 2 MG/ML
0.5 INJECTION, SOLUTION INTRAMUSCULAR; INTRAVENOUS; SUBCUTANEOUS
Status: DISCONTINUED | OUTPATIENT
Start: 2019-03-18 | End: 2019-03-18 | Stop reason: HOSPADM

## 2019-03-18 RX ORDER — SODIUM CHLORIDE, SODIUM LACTATE, POTASSIUM CHLORIDE, CALCIUM CHLORIDE 600; 310; 30; 20 MG/100ML; MG/100ML; MG/100ML; MG/100ML
100 INJECTION, SOLUTION INTRAVENOUS CONTINUOUS
Status: DISCONTINUED | OUTPATIENT
Start: 2019-03-18 | End: 2019-03-18 | Stop reason: HOSPADM

## 2019-03-18 RX ORDER — OXYCODONE HYDROCHLORIDE 5 MG/1
10 TABLET ORAL
Status: COMPLETED | OUTPATIENT
Start: 2019-03-18 | End: 2019-03-18

## 2019-03-18 RX ORDER — EPHEDRINE SULFATE 50 MG/ML
INJECTION, SOLUTION INTRAVENOUS AS NEEDED
Status: DISCONTINUED | OUTPATIENT
Start: 2019-03-18 | End: 2019-03-18 | Stop reason: HOSPADM

## 2019-03-18 RX ADMIN — ACETAMINOPHEN 1000 MG: 500 TABLET, FILM COATED ORAL at 07:12

## 2019-03-18 RX ADMIN — LIDOCAINE HYDROCHLORIDE 100 MG: 20 INJECTION, SOLUTION EPIDURAL; INFILTRATION; INTRACAUDAL; PERINEURAL at 07:35

## 2019-03-18 RX ADMIN — ONDANSETRON 4 MG: 2 INJECTION INTRAMUSCULAR; INTRAVENOUS at 08:11

## 2019-03-18 RX ADMIN — PROPOFOL 190 MG: 10 INJECTION, EMULSION INTRAVENOUS at 07:35

## 2019-03-18 RX ADMIN — EPHEDRINE SULFATE 5 MG: 50 INJECTION, SOLUTION INTRAVENOUS at 07:53

## 2019-03-18 RX ADMIN — FENTANYL CITRATE 25 MCG: 50 INJECTION, SOLUTION INTRAMUSCULAR; INTRAVENOUS at 07:45

## 2019-03-18 RX ADMIN — SODIUM CHLORIDE, SODIUM LACTATE, POTASSIUM CHLORIDE, AND CALCIUM CHLORIDE 100 ML/HR: 600; 310; 30; 20 INJECTION, SOLUTION INTRAVENOUS at 07:10

## 2019-03-18 RX ADMIN — EPHEDRINE SULFATE 5 MG: 50 INJECTION, SOLUTION INTRAVENOUS at 07:58

## 2019-03-18 RX ADMIN — FENTANYL CITRATE 25 MCG: 50 INJECTION, SOLUTION INTRAMUSCULAR; INTRAVENOUS at 07:35

## 2019-03-18 RX ADMIN — OXYCODONE HYDROCHLORIDE 5 MG: 5 TABLET ORAL at 08:44

## 2019-03-18 RX ADMIN — CIPROFLOXACIN 400 MG: 2 INJECTION, SOLUTION INTRAVENOUS at 07:36

## 2019-03-18 NOTE — ANESTHESIA POSTPROCEDURE EVALUATION
Procedure(s):  CYSTOSCOPY - URETHRAL DILATATION - CYSTOGRAM.    Anesthesia Post Evaluation      Multimodal analgesia: multimodal analgesia used between 6 hours prior to anesthesia start to PACU discharge  Patient location during evaluation: PACU  Patient participation: complete - patient participated  Level of consciousness: awake and alert  Pain management: adequate  Airway patency: patent  Anesthetic complications: no  Cardiovascular status: acceptable  Respiratory status: acceptable  Hydration status: acceptable  Post anesthesia nausea and vomiting:  none      Visit Vitals  /78   Pulse 63   Temp 36.4 °C (97.6 °F)   Resp 14   Wt 80.4 kg (177 lb 3.2 oz)   SpO2 95%   BMI 24.71 kg/m²

## 2019-03-18 NOTE — OP NOTES
[address] [place] [address]  OPERATIVE REPORT    Name:  Mitch Dominique  MR#:  [id number]  :  1950  ACCOUNT #:  [de-identified]  DATE OF SERVICE:  2019    PREOPERATIVE DIAGNOSIS:  Penile urethral strictures. POSTOPERATIVE DIAGNOSIS:  Penile urethral strictures. PROCEDURE PERFORMED:  Cystoscopy, cystogram, and urethral dilation. SURGEON:  Bam Pillai DO    ASSISTANT:  None. ANESTHESIA:  General.    COMPLICATIONS:  None immediate. SPECIMENS REMOVED:  None. IMPLANTS:  None. ESTIMATED BLOOD LOSS:  Less than 5 mL. CLINICAL HISTORY:  This is a 51-year-old gentleman who is status post robotic prostatectomy on 2019. He has done well postoperatively; however, reports a recent reduction in his urinary stream.  Recent cystoscopy on 03/15/2019 showed penile urethral stricture disease and recommended the above-mentioned procedure in the OR. All risks, benefits and alternatives to the procedure have been discussed and he is willing to proceed at this time. DESCRIPTION OF OPERATIVE PROCEDURE:  Patient consent was obtained. The patient was brought back to the operating room. At which time, he was placed in the supine position. After the uneventful induction of general anesthesia, he was then placed in a dorsal lithotomy position. His genital area was prepped and draped and a sterile field applied. A 17-German cystoscope was inserted into urethra and advanced under direct vision. I encountered a tight urethral stricture at the penile urethra. There also appeared to be a stricture just proximal to this. A guidewire was passed through the true lumen of the urethra into the bladder using cystoscopy as guidance. A 5-German open-ended ureteral catheter was then passed over the wire to the bladder and the wire was removed. A cystogram was performed to ensure that the catheter was in the bladder.   This showed a smooth contour bladder without extravasation of contrast.  No filling defects were seen. The wire was then replaced and the open-ended ureteral catheter was removed. The strictures were then serially dilated using Amplatz dilators up to 20-Rwandan. I did meet some resistance at this levels; therefore, no further dilation was performed. A 17-Rwandan cystoscope was then inserted alongside the wire. The patient appeared to have an extremely friable mucosa in the penile urethra that extended approximately 2-3 cm beyond this. The urethra appeared unremarkable. No bladder neck contracture was seen. The bladder neck appeared well-healed. The bladder was systematically surveyed. No abnormalities were seen. The scope was then removed and a 22-Rwandan cystoscope was then passed alongside the wire. Similar findings were seen. This was used to passively dilate the strictures again. Next, a 25-Rwandan cystoscope was passed in similar fashion. Following this, the scope was removed and a 22-Rwandan Pala-tipped catheter was then passed over the wire to dependent drainage. The patient tolerated the procedure well. I will plan to have him remove his catheter on 03/20/2019. He has regularly scheduled followup with me early next month. He was instructed to call me for any problems or concerns. INTERPRETATION OF CYSTOGRAM: A cystogram was performed using a 5-Rwandan open-ended ureteral catheter. Smooth contour bladder was seen. No filling defects or extravasation of contrast was noted.         DO MENG [id number]  D:  03/18/2019 8:23  T:  03/18/2019 10:24  JOB #:  [id number]  CC:

## 2019-03-18 NOTE — ANESTHESIA PREPROCEDURE EVALUATION
Anesthetic History   No history of anesthetic complications       Comments: Please place some gauze in mouth to protect tongue , he bit it badly last time     Review of Systems / Medical History  Patient summary reviewed and pertinent labs reviewed    Pulmonary  Within defined limits                 Neuro/Psych   Within defined limits           Cardiovascular    Hypertension: well controlled          Hyperlipidemia    Exercise tolerance: >4 METS     GI/Hepatic/Renal     GERD: well controlled           Endo/Other        Cancer (prostate)     Other Findings            Physical Exam    Airway  Mallampati: II  TM Distance: 4 - 6 cm  Neck ROM: normal range of motion   Mouth opening: Normal     Cardiovascular    Rhythm: regular  Rate: normal         Dental    Dentition: Caps/crowns     Pulmonary  Breath sounds clear to auscultation               Abdominal         Other Findings            Anesthetic Plan    ASA: 2  Anesthesia type: general          Induction: Intravenous  Anesthetic plan and risks discussed with: Patient and Spouse      Discussed GA with LMA

## 2019-03-18 NOTE — BRIEF OP NOTE
BRIEF OPERATIVE NOTE    Date of Procedure: 3/18/2019   Preoperative Diagnosis: Penile urethral strictures  Postoperative Diagnosis: Same    Procedure(s):  CYSTOSCOPY - URETHRAL DILATATION - CYSTOGRAM  Surgeon(s) and Role:     * Kasi Pillai DO - Primary         Surgical Assistant: None    Surgical Staff:  Circ-1: Gianna Lozano RN  Event Time In Time Out   Incision Start 0750    Incision Close 0809      Anesthesia: General   Estimated Blood Loss: <5cc  Specimens: * No specimens in log *   Findings: see op note  Complications: none immediate  Implants: * No implants in log *

## 2019-03-18 NOTE — PROGRESS NOTES
Pt instructed how to remove spain catheter with 10cc syringe.  Pt given syringe used for spain removal. Pt and wife verbalized understanding of spain catheter removal.

## 2019-04-17 ENCOUNTER — APPOINTMENT (RX ONLY)
Dept: URBAN - METROPOLITAN AREA CLINIC 349 | Facility: CLINIC | Age: 69
Setting detail: DERMATOLOGY
End: 2019-04-17

## 2019-04-17 DIAGNOSIS — L82.0 INFLAMED SEBORRHEIC KERATOSIS: ICD-10-CM

## 2019-04-17 DIAGNOSIS — L82.1 OTHER SEBORRHEIC KERATOSIS: ICD-10-CM

## 2019-04-17 PROCEDURE — A4550 SURGICAL TRAYS: HCPCS

## 2019-04-17 PROCEDURE — ? COUNSELING

## 2019-04-17 PROCEDURE — ? PATHOLOGY BILLING

## 2019-04-17 PROCEDURE — ? PHOTO-DOCUMENTATION

## 2019-04-17 PROCEDURE — 17110 DESTRUCTION B9 LES UP TO 14: CPT | Mod: 59

## 2019-04-17 PROCEDURE — 11301 SHAVE SKIN LESION 0.6-1.0 CM: CPT

## 2019-04-17 PROCEDURE — 88305 TISSUE EXAM BY PATHOLOGIST: CPT

## 2019-04-17 PROCEDURE — 11301 SHAVE SKIN LESION 0.6-1.0 CM: CPT | Mod: 76

## 2019-04-17 PROCEDURE — ? BENIGN DESTRUCTION

## 2019-04-17 PROCEDURE — ? SHAVE REMOVAL

## 2019-04-17 PROCEDURE — 99213 OFFICE O/P EST LOW 20 MIN: CPT | Mod: 25

## 2019-04-17 ASSESSMENT — LOCATION DETAILED DESCRIPTION DERM
LOCATION DETAILED: LEFT DISTAL DORSAL FOREARM
LOCATION DETAILED: MID TRAPEZIAL NECK
LOCATION DETAILED: POSTERIOR MID-PARIETAL SCALP
LOCATION DETAILED: SUPERIOR THORACIC SPINE
LOCATION DETAILED: RIGHT LATERAL SUPERIOR CHEST
LOCATION DETAILED: SUPERIOR THORACIC SPINE
LOCATION DETAILED: RIGHT PROXIMAL PRETIBIAL REGION
LOCATION DETAILED: LEFT MEDIAL TRAPEZIAL NECK
LOCATION DETAILED: RIGHT PROXIMAL DORSAL FOREARM
LOCATION DETAILED: RIGHT ANTERIOR MEDIAL DISTAL THIGH
LOCATION DETAILED: RIGHT DISTAL PRETIBIAL REGION
LOCATION DETAILED: LEFT SUPERIOR HELIX
LOCATION DETAILED: RIGHT ANTERIOR SHOULDER

## 2019-04-17 ASSESSMENT — LOCATION SIMPLE DESCRIPTION DERM
LOCATION SIMPLE: POSTERIOR NECK
LOCATION SIMPLE: CHEST
LOCATION SIMPLE: TRAPEZIAL NECK
LOCATION SIMPLE: RIGHT SHOULDER
LOCATION SIMPLE: RIGHT PRETIBIAL REGION
LOCATION SIMPLE: UPPER BACK
LOCATION SIMPLE: RIGHT THIGH
LOCATION SIMPLE: RIGHT PRETIBIAL REGION
LOCATION SIMPLE: LEFT EAR
LOCATION SIMPLE: RIGHT FOREARM
LOCATION SIMPLE: LEFT FOREARM
LOCATION SIMPLE: POSTERIOR SCALP
LOCATION SIMPLE: UPPER BACK

## 2019-04-17 ASSESSMENT — LOCATION ZONE DERM
LOCATION ZONE: TRUNK
LOCATION ZONE: LEG
LOCATION ZONE: NECK
LOCATION ZONE: SCALP
LOCATION ZONE: EAR
LOCATION ZONE: LEG
LOCATION ZONE: TRUNK
LOCATION ZONE: ARM

## 2019-04-17 NOTE — PROCEDURE: PATHOLOGY BILLING
Immunohistochemistry ([id number] and [id number]) billing is not performed here. Please use the Immunohistochemistry Stain Billing plan to accomplish this.

## 2019-04-17 NOTE — PROCEDURE: SHAVE REMOVAL
Biopsy Method: Personna blade
Wound Care: Vaseline
Render Post-Care Instructions In Note?: no
Was A Bandage Applied: Yes
Medical Necessity Information: It is in your best interest to select a reason for this procedure from the list below. All of these items fulfill various CMS LCD requirements except the new and changing color options.
Post-Care Instructions: I reviewed with the patient in detail post-care instructions. Patient is to keep the biopsy site dry overnight, and then apply bacitracin twice daily until healed. Patient may apply hydrogen peroxide soaks to remove any crusting.
Medical Necessity Clause: This procedure was medically necessary because the lesion that was treated was: irritated and two tone color
X Size Of Lesion In Cm (Optional): 0
Detail Level: Detailed
Accession #: md spence
Consent was obtained from the patient. The risks and benefits to therapy were discussed in detail. Specifically, the risks of infection, scarring, bleeding, prolonged wound healing, incomplete removal, allergy to anesthesia, nerve injury and recurrence were addressed. Prior to the procedure, the treatment site was clearly identified and confirmed by the patient. All components of Universal Protocol/PAUSE Rule completed.
Hemostasis: Aluminum Chloride
Anesthesia Type: 1% lidocaine with epinephrine and a 1:10 solution of 8.4% sodium bicarbonate
Size Of Lesion In Cm (Required): 0.6
Notification Instructions: Patient will be notified of biopsy results. However, patient instructed to call the office if not contacted within 2 weeks.
Billing Type: Third-Party Bill

## 2019-04-17 NOTE — PROCEDURE: PATHOLOGY BILLING
Immunohistochemistry ([id number] and [id number]) billing is not performed here. Please use the Immunohistochemistry Stain Billing plan to accomplish this. Immunohistochemistry ([id number] and [id number]) billing is not performed here. Please use the Immunohistochemistry Stain Billing plan to accomplish this.

## 2019-04-17 NOTE — PROCEDURE: BENIGN DESTRUCTION
Include Z78.9 (Other Specified Conditions Influencing Health Status) As An Associated Diagnosis?: No
Medical Necessity Clause: This procedure was medically necessary because the lesions that were treated were:
Anesthesia Volume In Cc: 0
Post-Care Instructions: I reviewed with the patient in detail post-care instructions. Patient is to wear sunprotection, and avoid picking at any of the treated lesions. Pt may apply Vaseline to crusted or scabbing areas.
Consent: The patient's consent was obtained including but not limited to risks of crusting, scabbing, blistering, scarring, darker or lighter pigmentary change, recurrence, incomplete removal and infection.
Medical Necessity Information: It is in your best interest to select a reason for this procedure from the list below. All of these items fulfill various CMS LCD requirements except the new and changing color options.
Detail Level: Detailed

## (undated) DEVICE — PROGRASP FORCEPS: Brand: ENDOWRIST

## (undated) DEVICE — KENDALL SCD EXPRESS SLEEVES, KNEE LENGTH, MEDIUM: Brand: KENDALL SCD

## (undated) DEVICE — SUTURE MCRYL SZ 3-0 L27IN ABSRB VLT L17MM RB-1 1/2 CIR Y305H

## (undated) DEVICE — SOLUTION IRRIG 1000ML H2O STRL BLT

## (undated) DEVICE — 40585 XL ADVANCED TRENDELENBURG POSITIONING KIT: Brand: 40585 XL ADVANCED TRENDELENBURG POSITIONING KIT

## (undated) DEVICE — 2, DISPOSABLE SUCTION/IRRIGATOR WITHOUT DISPOSABLE TIP: Brand: STRYKEFLOW

## (undated) DEVICE — DRAPE TWL SURG 16X26IN BLU ORB04] ALLCARE INC]

## (undated) DEVICE — CATH URETH FOL 2W SH 20FRX5 --

## (undated) DEVICE — (D)PREP SKN CHLRAPRP APPL 26ML -- CONVERT TO ITEM 371833

## (undated) DEVICE — LITHOTOMY: Brand: MEDLINE INDUSTRIES, INC.

## (undated) DEVICE — LEGGINGS, PAIR, 31X48, STERILE: Brand: MEDLINE

## (undated) DEVICE — 3M™ TEGADERM™ TRANSPARENT FILM DRESSING FRAME STYLE, 1624W, 2-3/8 IN X 2-3/4 IN (6 CM X 7 CM), 100/CT 4CT/CASE: Brand: 3M™ TEGADERM™

## (undated) DEVICE — SUTURE MCRYL SZ 4-0 L27IN ABSRB UD L19MM PS-2 1/2 CIR PRIM Y426H

## (undated) DEVICE — SYR 10ML LUER LOK 1/5ML GRAD --

## (undated) DEVICE — MARYLAND BIPOLAR FORCEPS: Brand: ENDOWRIST

## (undated) DEVICE — LARGE NEEDLE DRIVER: Brand: ENDOWRIST

## (undated) DEVICE — 2000CC GUARDIAN II: Brand: GUARDIAN

## (undated) DEVICE — NEEDLE HYPO 21GA L1.5IN INTRAMUSCULAR S STL LATCH BVL UP

## (undated) DEVICE — Device

## (undated) DEVICE — CATHETER F BLLN 5CC 18FR 2 W HYDRGEL COAT LESS TRAUM LUB

## (undated) DEVICE — SCISSORS SURG DIA8MM MPLR CRV ENDOWRIST

## (undated) DEVICE — LARGE HEM-O-LOK CLIP APPLIER: Brand: ENDOWRIST

## (undated) DEVICE — SEAL UNIV 5-8MM DISP BX/10 -- DA VINCI XI - SNGL USE

## (undated) DEVICE — CLIP INT 12MM BLK SGL ABSRB LIG LAPRO

## (undated) DEVICE — SUTURE MCRYL SZ 3-0 L27IN ABSRB UD L17MM RB-1 1/2 CIR Y215H

## (undated) DEVICE — BAG DRNGE 4000ML CONT IRRIG ROUNDED TEARDROP SHP DISP

## (undated) DEVICE — ARM DRAPE

## (undated) DEVICE — SURGIFOAM SPNG SZ 100

## (undated) DEVICE — HYPODERMIC SAFETY NEEDLE: Brand: MAGELLAN

## (undated) DEVICE — REM POLYHESIVE ADULT PATIENT RETURN ELECTRODE: Brand: VALLEYLAB

## (undated) DEVICE — DEVICE SECUREMENT AD W/ TRICOT ANCHR PD FOR F LTX SIL CATH

## (undated) DEVICE — APPLICATOR BNDG 1MM ADH PREMIERPRO EXOFIN

## (undated) DEVICE — CATH URET 5FRX70CM W/OPN END -- BX/20

## (undated) DEVICE — BIPOLAR CAUTERY CORD

## (undated) DEVICE — LAP CHOLE: Brand: MEDLINE INDUSTRIES, INC.

## (undated) DEVICE — CYSTO: Brand: MEDLINE INDUSTRIES, INC.

## (undated) DEVICE — X-RAY SPONGES,12 PLY: Brand: DERMACEA

## (undated) DEVICE — AMD ANTIMICROBIAL GAUZE SPONGES,12 PLY USP TYPE VII, 0.2% POLYHEXAMETHYLENE BIGUANIDE HCI (PHMB): Brand: CURITY

## (undated) DEVICE — SUTURE VCRL SZ 2-0 L36IN ABSRB UD L36MM CT-1 1/2 CIR J945H

## (undated) DEVICE — VISUALIZATION SYSTEM: Brand: CLEARIFY

## (undated) DEVICE — SUTURE SZ 0 27IN 5/8 CIR UR-6  TAPER PT VIOLET ABSRB VICRYL J603H

## (undated) DEVICE — PERI-PAD,MODERATE: Brand: CURITY

## (undated) DEVICE — CARDINAL HEALTH FLEXIBLE LIGHT HANDLE COVER: Brand: CARDINAL HEALTH

## (undated) DEVICE — SYRINGE CATH TIP 50ML

## (undated) DEVICE — SUTURE VCRL SZ 3-0 L27IN ABSRB UD L17MM RB-1 1/2 CIR J215H

## (undated) DEVICE — TIP COVER ACCESSORY

## (undated) DEVICE — GUIDEWIRE ENDOSCP L150CM DIA0.038IN TIP L3CM PTFE FLX STR

## (undated) DEVICE — MONOPOLAR CAUTERY CORD

## (undated) DEVICE — DRAPE,TOP,102X53,STERILE: Brand: MEDLINE

## (undated) DEVICE — JELLY LUBRICATING 10GM PREFIL SYR LUBE

## (undated) DEVICE — DRAIN JACKSON PRATT ROUND 15FR: Brand: CARDINAL HEALTH

## (undated) DEVICE — MATERNITY KNIT PANTS,SEAMLESS: Brand: WINGS

## (undated) DEVICE — TELFA NON-ADHERENT PADS PREPAK: Brand: TELFA

## (undated) DEVICE — APPLICATOR COT TIP CTR 6IN

## (undated) DEVICE — [HIGH FLOW INSUFFLATOR,  DO NOT USE IF PACKAGE IS DAMAGED,  KEEP DRY,  KEEP AWAY FROM SUNLIGHT,  PROTECT FROM HEAT AND RADIOACTIVE SOURCES.]: Brand: PNEUMOSURE

## (undated) DEVICE — SOLUTION IRRIG 3000ML H2O STRL BAG

## (undated) DEVICE — CYSTO/BLADDER IRRIGATION SET, REGULATING CLAMP

## (undated) DEVICE — ELECTRO LUBE IS A SINGLE PATIENT USE DEVICE THAT IS INTENDED TO BE USED ON ELECTROSURGICAL ELECTRODES TO REDUCE STICKING.: Brand: KEY SURGICAL ELECTRO LUBE

## (undated) DEVICE — 1071 S-DRP URO STLE-GAMA 10/BX,4X/C: Brand: STERI-DRAPE™

## (undated) DEVICE — NEEDLE INSUF L120MM ULT VERES ENDOPATH

## (undated) DEVICE — SUTURE ETHLN SZ 2-0 L18IN NONABSORBABLE BLK L26MM PS 3/8 585H

## (undated) DEVICE — CATHETER URETH 22FR BLLN 5CC STD LTX 2 W TWO OPP DRNGE EYE

## (undated) DEVICE — BASIC SINGLE BASIN-LF: Brand: MEDLINE INDUSTRIES, INC.

## (undated) DEVICE — GOWN,REINF,POLY,ECL,PP SLV,XL: Brand: MEDLINE

## (undated) DEVICE — SPECIMEN RETRIEVAL POUCH: Brand: ENDO CATCH GOLD

## (undated) DEVICE — DRAPE,UNDERBUTTOCKS,PCH,STERILE: Brand: MEDLINE

## (undated) DEVICE — BLADELESS OPTICAL TROCAR WITH FIXATION CANNULA: Brand: VERSAONE

## (undated) DEVICE — TROCAR LAP L100MM DIA5MM BLDELSS W/ STBL SL ENDOPATH XCEL

## (undated) DEVICE — BLADELESS OBTURATOR: Brand: WECK VISTA

## (undated) DEVICE — TRAY PREP DRY W/ PREM GLV 2 APPL 6 SPNG 2 UNDPD 1 OVERWRAP

## (undated) DEVICE — SPONGE LAP 18X18IN STRL -- 5/PK

## (undated) DEVICE — COLUMN DRAPE

## (undated) DEVICE — COVER,TABLE,44X76,STERILE: Brand: MEDLINE

## (undated) DEVICE — LOGICUT SCISSOR LENGTH 450MM: Brand: LOGI - LAPAROSCOPIC INSTRUMENT SYSTEM